# Patient Record
Sex: FEMALE | Race: WHITE | Employment: UNEMPLOYED | URBAN - METROPOLITAN AREA
[De-identification: names, ages, dates, MRNs, and addresses within clinical notes are randomized per-mention and may not be internally consistent; named-entity substitution may affect disease eponyms.]

---

## 2022-08-11 LAB
C. TRACHOMATIS, EXTERNAL RESULT: NEGATIVE
N. GONORRHOEAE, EXTERNAL RESULT: NEGATIVE

## 2022-12-29 LAB
ABO, EXTERNAL RESULT: NORMAL
HEP B, EXTERNAL RESULT: NEGATIVE
HIV, EXTERNAL RESULT: NEGATIVE
RH FACTOR, EXTERNAL RESULT: POSITIVE
RPR, EXTERNAL RESULT: NON REACTIVE
RUBELLA TITER, EXTERNAL RESULT: NORMAL

## 2023-03-10 LAB — GBS, EXTERNAL RESULT: NEGATIVE

## 2023-03-19 ENCOUNTER — HOSPITAL ENCOUNTER (INPATIENT)
Age: 23
LOS: 2 days | Discharge: HOME | End: 2023-03-21
Attending: OBSTETRICS & GYNECOLOGY | Admitting: OBSTETRICS & GYNECOLOGY
Payer: COMMERCIAL

## 2023-03-19 ENCOUNTER — ANESTHESIA EVENT (OUTPATIENT)
Dept: LABOR AND DELIVERY | Age: 23
End: 2023-03-19
Payer: COMMERCIAL

## 2023-03-19 ENCOUNTER — ANESTHESIA (OUTPATIENT)
Dept: LABOR AND DELIVERY | Age: 23
End: 2023-03-19
Payer: COMMERCIAL

## 2023-03-19 PROBLEM — Z37.9 NORMAL LABOR: Status: ACTIVE | Noted: 2023-03-19

## 2023-03-19 LAB
ABO + RH BLD: NORMAL
AMPHETAMINES UR QL SCN>1000 NG/ML: NORMAL
BARBITURATES UR QL SCN>200 NG/ML: NORMAL
BASOPHILS # BLD: 0.1 K/UL (ref 0–0.2)
BASOPHILS NFR BLD: 0.5 %
BENZODIAZ UR QL SCN>200 NG/ML: NORMAL
BLD GP AB SCN SERPL QL: NORMAL
BUPRENORPHINE+NOR UR QL SCN: NORMAL
CANNABINOIDS UR QL SCN>50 NG/ML: NORMAL
COCAINE UR QL SCN: NORMAL
DEPRECATED RDW RBC AUTO: 14 % (ref 12.4–15.4)
DRUG SCREEN COMMENT UR-IMP: NORMAL
EOSINOPHIL # BLD: 0 K/UL (ref 0–0.6)
EOSINOPHIL NFR BLD: 0.2 %
FENTANYL SCREEN, URINE: NORMAL
HCT VFR BLD AUTO: 40.5 % (ref 36–48)
HGB BLD-MCNC: 13.6 G/DL (ref 12–16)
LYMPHOCYTES # BLD: 1.3 K/UL (ref 1–5.1)
LYMPHOCYTES NFR BLD: 10.3 %
MCH RBC QN AUTO: 31.4 PG (ref 26–34)
MCHC RBC AUTO-ENTMCNC: 33.6 G/DL (ref 31–36)
MCV RBC AUTO: 93.5 FL (ref 80–100)
METHADONE UR QL SCN>300 NG/ML: NORMAL
MONOCYTES # BLD: 0.9 K/UL (ref 0–1.3)
MONOCYTES NFR BLD: 6.9 %
NEUTROPHILS # BLD: 10.4 K/UL (ref 1.7–7.7)
NEUTROPHILS NFR BLD: 82.1 %
OPIATES UR QL SCN>300 NG/ML: NORMAL
OXYCODONE UR QL SCN: NORMAL
PCP UR QL SCN>25 NG/ML: NORMAL
PH UR STRIP: 7 [PH]
PLATELET # BLD AUTO: 207 K/UL (ref 135–450)
PMV BLD AUTO: 9.2 FL (ref 5–10.5)
RBC # BLD AUTO: 4.33 M/UL (ref 4–5.2)
WBC # BLD AUTO: 12.6 K/UL (ref 4–11)

## 2023-03-19 PROCEDURE — 2500000003 HC RX 250 WO HCPCS

## 2023-03-19 PROCEDURE — 86780 TREPONEMA PALLIDUM: CPT

## 2023-03-19 PROCEDURE — 85025 COMPLETE CBC W/AUTO DIFF WBC: CPT

## 2023-03-19 PROCEDURE — 2580000003 HC RX 258: Performed by: OBSTETRICS & GYNECOLOGY

## 2023-03-19 PROCEDURE — 6360000002 HC RX W HCPCS

## 2023-03-19 PROCEDURE — 3700000025 EPIDURAL BLOCK: Performed by: ANESTHESIOLOGY

## 2023-03-19 PROCEDURE — 86900 BLOOD TYPING SEROLOGIC ABO: CPT

## 2023-03-19 PROCEDURE — 51701 INSERT BLADDER CATHETER: CPT

## 2023-03-19 PROCEDURE — 86901 BLOOD TYPING SEROLOGIC RH(D): CPT

## 2023-03-19 PROCEDURE — 80307 DRUG TEST PRSMV CHEM ANLYZR: CPT

## 2023-03-19 PROCEDURE — 86850 RBC ANTIBODY SCREEN: CPT

## 2023-03-19 PROCEDURE — 1220000000 HC SEMI PRIVATE OB R&B

## 2023-03-19 RX ORDER — FENTANYL CITRATE 50 UG/ML
INJECTION, SOLUTION INTRAMUSCULAR; INTRAVENOUS
Status: COMPLETED
Start: 2023-03-19 | End: 2023-03-19

## 2023-03-19 RX ORDER — FENTANYL CITRATE 50 UG/ML
INJECTION, SOLUTION INTRAMUSCULAR; INTRAVENOUS
Status: COMPLETED
Start: 2023-03-19 | End: 2023-03-20

## 2023-03-19 RX ORDER — ACETAMINOPHEN 325 MG/1
650 TABLET ORAL EVERY 4 HOURS PRN
Status: DISCONTINUED | OUTPATIENT
Start: 2023-03-19 | End: 2023-03-20

## 2023-03-19 RX ORDER — BUPIVACAINE HYDROCHLORIDE 2.5 MG/ML
INJECTION, SOLUTION EPIDURAL; INFILTRATION; INTRACAUDAL PRN
Status: DISCONTINUED | OUTPATIENT
Start: 2023-03-19 | End: 2023-03-20 | Stop reason: SDUPTHER

## 2023-03-19 RX ORDER — SODIUM CHLORIDE 0.9 % (FLUSH) 0.9 %
5-40 SYRINGE (ML) INJECTION PRN
Status: DISCONTINUED | OUTPATIENT
Start: 2023-03-19 | End: 2023-03-20

## 2023-03-19 RX ORDER — SODIUM CHLORIDE 0.9 % (FLUSH) 0.9 %
5-40 SYRINGE (ML) INJECTION EVERY 12 HOURS SCHEDULED
Status: DISCONTINUED | OUTPATIENT
Start: 2023-03-19 | End: 2023-03-20

## 2023-03-19 RX ORDER — CARBOPROST TROMETHAMINE 250 UG/ML
250 INJECTION, SOLUTION INTRAMUSCULAR PRN
Status: DISCONTINUED | OUTPATIENT
Start: 2023-03-19 | End: 2023-03-20

## 2023-03-19 RX ORDER — DIPHENHYDRAMINE HCL 25 MG
25 TABLET ORAL EVERY 4 HOURS PRN
Status: DISCONTINUED | OUTPATIENT
Start: 2023-03-19 | End: 2023-03-20

## 2023-03-19 RX ORDER — LIDOCAINE HYDROCHLORIDE 20 MG/ML
INJECTION, SOLUTION INFILTRATION; PERINEURAL PRN
Status: DISCONTINUED | OUTPATIENT
Start: 2023-03-19 | End: 2023-03-20 | Stop reason: SDUPTHER

## 2023-03-19 RX ORDER — MISOPROSTOL 100 UG/1
800 TABLET ORAL PRN
Status: DISCONTINUED | OUTPATIENT
Start: 2023-03-19 | End: 2023-03-20

## 2023-03-19 RX ORDER — CETIRIZINE HYDROCHLORIDE 10 MG/1
10 TABLET ORAL DAILY
Status: ON HOLD | COMMUNITY
End: 2023-03-21 | Stop reason: HOSPADM

## 2023-03-19 RX ORDER — FENTANYL CITRATE 50 UG/ML
INJECTION, SOLUTION INTRAMUSCULAR; INTRAVENOUS PRN
Status: DISCONTINUED | OUTPATIENT
Start: 2023-03-19 | End: 2023-03-20 | Stop reason: SDUPTHER

## 2023-03-19 RX ORDER — LIDOCAINE HYDROCHLORIDE AND EPINEPHRINE BITARTRATE 20; .01 MG/ML; MG/ML
INJECTION, SOLUTION SUBCUTANEOUS PRN
Status: DISCONTINUED | OUTPATIENT
Start: 2023-03-19 | End: 2023-03-20 | Stop reason: SDUPTHER

## 2023-03-19 RX ORDER — SODIUM CHLORIDE, SODIUM LACTATE, POTASSIUM CHLORIDE, CALCIUM CHLORIDE 600; 310; 30; 20 MG/100ML; MG/100ML; MG/100ML; MG/100ML
INJECTION, SOLUTION INTRAVENOUS CONTINUOUS
Status: DISCONTINUED | OUTPATIENT
Start: 2023-03-19 | End: 2023-03-20

## 2023-03-19 RX ORDER — SODIUM CHLORIDE 9 MG/ML
25 INJECTION, SOLUTION INTRAVENOUS PRN
Status: DISCONTINUED | OUTPATIENT
Start: 2023-03-19 | End: 2023-03-20

## 2023-03-19 RX ORDER — METHYLERGONOVINE MALEATE 0.2 MG/ML
200 INJECTION INTRAVENOUS PRN
Status: DISCONTINUED | OUTPATIENT
Start: 2023-03-19 | End: 2023-03-20

## 2023-03-19 RX ORDER — SODIUM CHLORIDE, SODIUM LACTATE, POTASSIUM CHLORIDE, AND CALCIUM CHLORIDE .6; .31; .03; .02 G/100ML; G/100ML; G/100ML; G/100ML
500 INJECTION, SOLUTION INTRAVENOUS PRN
Status: DISCONTINUED | OUTPATIENT
Start: 2023-03-19 | End: 2023-03-20

## 2023-03-19 RX ORDER — SODIUM CHLORIDE, SODIUM LACTATE, POTASSIUM CHLORIDE, AND CALCIUM CHLORIDE .6; .31; .03; .02 G/100ML; G/100ML; G/100ML; G/100ML
1000 INJECTION, SOLUTION INTRAVENOUS PRN
Status: DISCONTINUED | OUTPATIENT
Start: 2023-03-19 | End: 2023-03-20

## 2023-03-19 RX ADMIN — LIDOCAINE HYDROCHLORIDE 5 ML: 20 INJECTION, SOLUTION INFILTRATION; PERINEURAL at 16:04

## 2023-03-19 RX ADMIN — LIDOCAINE HYDROCHLORIDE AND EPINEPHRINE 5 ML: 20; 10 INJECTION, SOLUTION INFILTRATION; PERINEURAL at 23:51

## 2023-03-19 RX ADMIN — FENTANYL CITRATE 100 MCG: 50 INJECTION, SOLUTION INTRAMUSCULAR; INTRAVENOUS at 22:48

## 2023-03-19 RX ADMIN — SODIUM CHLORIDE, POTASSIUM CHLORIDE, SODIUM LACTATE AND CALCIUM CHLORIDE: 600; 310; 30; 20 INJECTION, SOLUTION INTRAVENOUS at 22:54

## 2023-03-19 RX ADMIN — BUPIVACAINE HYDROCHLORIDE 5 MG: 2.5 INJECTION, SOLUTION EPIDURAL; INFILTRATION; INTRACAUDAL; PERINEURAL at 15:31

## 2023-03-19 RX ADMIN — LIDOCAINE HYDROCHLORIDE 5 ML: 20 INJECTION, SOLUTION INFILTRATION; PERINEURAL at 18:51

## 2023-03-19 RX ADMIN — LIDOCAINE HYDROCHLORIDE AND EPINEPHRINE 5 ML: 20; 10 INJECTION, SOLUTION INFILTRATION; PERINEURAL at 22:50

## 2023-03-19 RX ADMIN — BUPIVACAINE HYDROCHLORIDE 5 MG: 2.5 INJECTION, SOLUTION EPIDURAL; INFILTRATION; INTRACAUDAL; PERINEURAL at 19:52

## 2023-03-19 RX ADMIN — FENTANYL CITRATE 100 MCG: 50 INJECTION, SOLUTION INTRAMUSCULAR; INTRAVENOUS at 16:04

## 2023-03-19 RX ADMIN — Medication 15 ML/HR: at 15:31

## 2023-03-19 RX ADMIN — LIDOCAINE HYDROCHLORIDE AND EPINEPHRINE 3 ML: 20; 10 INJECTION, SOLUTION INFILTRATION; PERINEURAL at 15:29

## 2023-03-19 RX ADMIN — BUPIVACAINE HYDROCHLORIDE 5 MG: 2.5 INJECTION, SOLUTION EPIDURAL; INFILTRATION; INTRACAUDAL; PERINEURAL at 19:50

## 2023-03-19 RX ADMIN — FENTANYL CITRATE 100 MCG: 50 INJECTION, SOLUTION INTRAMUSCULAR; INTRAVENOUS at 23:51

## 2023-03-19 RX ADMIN — BUPIVACAINE HYDROCHLORIDE 5 MG: 2.5 INJECTION, SOLUTION EPIDURAL; INFILTRATION; INTRACAUDAL; PERINEURAL at 15:37

## 2023-03-19 RX ADMIN — SODIUM CHLORIDE, POTASSIUM CHLORIDE, SODIUM LACTATE AND CALCIUM CHLORIDE: 600; 310; 30; 20 INJECTION, SOLUTION INTRAVENOUS at 15:00

## 2023-03-19 RX ADMIN — FENTANYL CITRATE 100 MCG: 50 INJECTION, SOLUTION INTRAMUSCULAR; INTRAVENOUS at 18:51

## 2023-03-19 RX ADMIN — SODIUM CHLORIDE, POTASSIUM CHLORIDE, SODIUM LACTATE AND CALCIUM CHLORIDE: 600; 310; 30; 20 INJECTION, SOLUTION INTRAVENOUS at 15:59

## 2023-03-19 RX ADMIN — SODIUM CHLORIDE, POTASSIUM CHLORIDE, SODIUM LACTATE AND CALCIUM CHLORIDE: 600; 310; 30; 20 INJECTION, SOLUTION INTRAVENOUS at 15:48

## 2023-03-19 NOTE — H&P
Department of Obstetrics and Gynecology   Obstetrics History and Physical        CHIEF COMPLAINT:  contractions    HISTORY OF PRESENT ILLNESS:      The patient is a 20 y.o. female at 38w0d. OB History          1    Para        Term                AB        Living             SAB        IAB        Ectopic        Molar        Multiple        Live Births                Patient presents with a chief complaint as above and is being admitted for active phase labor    Estimated Due Date: Estimated Date of Delivery: 23    PRENATAL CARE:    Complicated by: none    PAST OB HISTORY:  OB History          1    Para        Term                AB        Living             SAB        IAB        Ectopic        Molar        Multiple        Live Births                    Past Medical History:        Diagnosis Date    Mental disorder      Past Surgical History:        Procedure Laterality Date    WISDOM TOOTH EXTRACTION Bilateral      Allergies:  Patient has no known allergies.     Social History:    Social History     Socioeconomic History    Marital status:      Spouse name: Not on file    Number of children: Not on file    Years of education: Not on file    Highest education level: Not on file   Occupational History    Not on file   Tobacco Use    Smoking status: Never    Smokeless tobacco: Never   Vaping Use    Vaping Use: Never used   Substance and Sexual Activity    Alcohol use: No    Drug use: No    Sexual activity: Never   Other Topics Concern    Not on file   Social History Narrative    Not on file     Social Determinants of Health     Financial Resource Strain: Not on file   Food Insecurity: Not on file   Transportation Needs: Not on file   Physical Activity: Not on file   Stress: Not on file   Social Connections: Not on file   Intimate Partner Violence: Not on file   Housing Stability: Not on file     Family History:       Problem Relation Age of Onset    Diabetes Father     Cancer

## 2023-03-19 NOTE — ANESTHESIA PROCEDURE NOTES
Epidural Block    Patient location during procedure: OB  Start time: 3/19/2023 3:20 PM  End time: 3/19/2023 3:52 PM  Reason for block: labor epidural  Staffing  Performed: resident/CRNA   Resident/CRNA: VEL Long CRNA  Epidural  Patient position: sitting  Prep: Betadine  Patient monitoring: cardiac monitor, continuous pulse ox and frequent blood pressure checks  Approach: midline  Location: L4-5  Injection technique: LARRY air  Provider prep: mask and sterile gloves  Needle  Needle type: Tuohy   Needle gauge: 18 G  Needle length: 3.5 in  Needle insertion depth: 6 cm  Catheter type: side hole  Catheter size: 20 G.   Catheter at skin depth: 14 cm  Test dose: negativeCatheter Secured: tegaderm and tape  Assessment  Sensory level: T10  Hemodynamics: stable  Attempts: 1  Outcomes: uncomplicated and patient tolerated procedure well  Preanesthetic Checklist  Completed: patient identified, IV checked, site marked, risks and benefits discussed, surgical/procedural consents, equipment checked, pre-op evaluation, timeout performed, anesthesia consent given, oxygen available and monitors applied/VS acknowledged

## 2023-03-19 NOTE — PROGRESS NOTES
Moderate to firm contractions every 2 to 4 minutes. Active and reactive fetus. Report of same and of pt dil 2-3/80/-1 called to Dr Lilliana Miramontes. To admit pt for labor.

## 2023-03-19 NOTE — ANESTHESIA PRE PROCEDURE
Department of Anesthesiology  Preprocedure Note       Name:  Aung Locke   Age:  20 y.o.  :  2000                                          MRN:  [id number]         Date:  3/19/2023      Surgeon: * No surgeons listed *    Procedure: * No procedures listed *    Medications prior to admission:   Prior to Admission medications    Medication Sig Start Date End Date Taking?  Authorizing Provider   Prenatal MV-Min-Fe Fum-FA-DHA (PRENATAL 1 PO) Take by mouth   Yes Historical Provider, MD   Probiotic Product (PROBIOTIC-10 PO) Take by mouth   Yes Historical Provider, MD   cetirizine (ZYRTEC) 10 MG tablet Take 10 mg by mouth daily   Yes Historical Provider, MD       Current medications:    Current Facility-Administered Medications   Medication Dose Route Frequency Provider Last Rate Last Admin    lactated ringers IV soln infusion   IntraVENous Continuous Jerson Escalona MD        lactated ringers bolus  500 mL IntraVENous PRN Jerson Escalona MD        Or    lactated ringers bolus  1,000 mL IntraVENous PRN Jerson Escalona MD        sodium chloride flush 0.9 % injection 5-40 mL  5-40 mL IntraVENous 2 times per day Jerson Escalona MD        sodium chloride flush 0.9 % injection 5-40 mL  5-40 mL IntraVENous PRN Jerson Escalona MD        0.9 % sodium chloride infusion  25 mL IntraVENous PRN Jerson Escalona MD        diphenhydrAMINE (BENADRYL) tablet 25 mg  25 mg Oral Q4H PRN Jerson Escalona MD        methylergonovine (METHERGINE) injection 200 mcg  200 mcg IntraMUSCular PRN Jerson Escalona MD        carboprost (HEMABATE) injection 250 mcg  250 mcg IntraMUSCular PRN Jerson Escalona MD        miSOPROStol (CYTOTEC) tablet 800 mcg  800 mcg Rectal PRN Jerson Escalona MD        tranexamic acid (CYKLOKAPRON) 1,000 mg in sodium chloride 0.9 % 100 mL IVPB  1,000 mg IntraVENous Once PRN Jerson Escalona MD        acetaminophen (TYLENOL) tablet 650 mg  650 mg Oral

## 2023-03-19 NOTE — PROGRESS NOTES
Pt presents to triage 2 c/o contractions for the last two hours. States thinks her water broke on the way up to [facility].  [facility] [place] did not call her dr prior to coming to hospital

## 2023-03-20 LAB — REAGIN+T PALLIDUM IGG+IGM SERPL-IMP: NORMAL

## 2023-03-20 PROCEDURE — 51701 INSERT BLADDER CATHETER: CPT

## 2023-03-20 PROCEDURE — 6360000002 HC RX W HCPCS

## 2023-03-20 PROCEDURE — 2500000003 HC RX 250 WO HCPCS

## 2023-03-20 PROCEDURE — 1220000000 HC SEMI PRIVATE OB R&B

## 2023-03-20 PROCEDURE — 6370000000 HC RX 637 (ALT 250 FOR IP): Performed by: OBSTETRICS & GYNECOLOGY

## 2023-03-20 PROCEDURE — 7200000001 HC VAGINAL DELIVERY

## 2023-03-20 PROCEDURE — 0KQM0ZZ REPAIR PERINEUM MUSCLE, OPEN APPROACH: ICD-10-PCS | Performed by: OBSTETRICS & GYNECOLOGY

## 2023-03-20 PROCEDURE — 6360000002 HC RX W HCPCS: Performed by: OBSTETRICS & GYNECOLOGY

## 2023-03-20 RX ORDER — ACETAMINOPHEN 500 MG
1000 TABLET ORAL EVERY 8 HOURS SCHEDULED
Status: DISCONTINUED | OUTPATIENT
Start: 2023-03-20 | End: 2023-03-21 | Stop reason: HOSPADM

## 2023-03-20 RX ORDER — SODIUM CHLORIDE 9 MG/ML
INJECTION, SOLUTION INTRAVENOUS PRN
Status: DISCONTINUED | OUTPATIENT
Start: 2023-03-20 | End: 2023-03-21 | Stop reason: HOSPADM

## 2023-03-20 RX ORDER — METHYLERGONOVINE MALEATE 0.2 MG/ML
200 INJECTION INTRAVENOUS PRN
Status: DISCONTINUED | OUTPATIENT
Start: 2023-03-20 | End: 2023-03-21 | Stop reason: HOSPADM

## 2023-03-20 RX ORDER — IBUPROFEN 600 MG/1
600 TABLET ORAL EVERY 8 HOURS SCHEDULED
Status: DISCONTINUED | OUTPATIENT
Start: 2023-03-20 | End: 2023-03-21 | Stop reason: HOSPADM

## 2023-03-20 RX ORDER — DIPHENHYDRAMINE HYDROCHLORIDE 50 MG/ML
12.5 INJECTION INTRAMUSCULAR; INTRAVENOUS EVERY 6 HOURS PRN
Status: DISCONTINUED | OUTPATIENT
Start: 2023-03-20 | End: 2023-03-21 | Stop reason: HOSPADM

## 2023-03-20 RX ORDER — SODIUM CHLORIDE, SODIUM LACTATE, POTASSIUM CHLORIDE, CALCIUM CHLORIDE 600; 310; 30; 20 MG/100ML; MG/100ML; MG/100ML; MG/100ML
INJECTION, SOLUTION INTRAVENOUS CONTINUOUS
Status: DISCONTINUED | OUTPATIENT
Start: 2023-03-20 | End: 2023-03-21 | Stop reason: HOSPADM

## 2023-03-20 RX ORDER — SODIUM CHLORIDE 0.9 % (FLUSH) 0.9 %
5-40 SYRINGE (ML) INJECTION EVERY 12 HOURS SCHEDULED
Status: DISCONTINUED | OUTPATIENT
Start: 2023-03-20 | End: 2023-03-21 | Stop reason: HOSPADM

## 2023-03-20 RX ORDER — KETOROLAC TROMETHAMINE 30 MG/ML
30 INJECTION, SOLUTION INTRAMUSCULAR; INTRAVENOUS EVERY 6 HOURS
Status: COMPLETED | OUTPATIENT
Start: 2023-03-20 | End: 2023-03-20

## 2023-03-20 RX ORDER — SODIUM CHLORIDE 0.9 % (FLUSH) 0.9 %
5-40 SYRINGE (ML) INJECTION PRN
Status: DISCONTINUED | OUTPATIENT
Start: 2023-03-20 | End: 2023-03-21 | Stop reason: HOSPADM

## 2023-03-20 RX ORDER — DOCUSATE SODIUM 100 MG/1
100 CAPSULE, LIQUID FILLED ORAL 2 TIMES DAILY
Status: DISCONTINUED | OUTPATIENT
Start: 2023-03-20 | End: 2023-03-21 | Stop reason: HOSPADM

## 2023-03-20 RX ORDER — FERROUS SULFATE 325(65) MG
325 TABLET ORAL 2 TIMES DAILY WITH MEALS
Status: DISCONTINUED | OUTPATIENT
Start: 2023-03-20 | End: 2023-03-21 | Stop reason: HOSPADM

## 2023-03-20 RX ORDER — BUPIVACAINE HYDROCHLORIDE 5 MG/ML
INJECTION, SOLUTION EPIDURAL; INTRACAUDAL PRN
Status: DISCONTINUED | OUTPATIENT
Start: 2023-03-20 | End: 2023-03-20 | Stop reason: SDUPTHER

## 2023-03-20 RX ORDER — ONDANSETRON 2 MG/ML
4 INJECTION INTRAMUSCULAR; INTRAVENOUS EVERY 6 HOURS PRN
Status: DISCONTINUED | OUTPATIENT
Start: 2023-03-20 | End: 2023-03-21 | Stop reason: HOSPADM

## 2023-03-20 RX ORDER — ONDANSETRON 2 MG/ML
INJECTION INTRAMUSCULAR; INTRAVENOUS
Status: DISCONTINUED
Start: 2023-03-20 | End: 2023-03-20 | Stop reason: WASHOUT

## 2023-03-20 RX ORDER — LIDOCAINE HYDROCHLORIDE 10 MG/ML
INJECTION, SOLUTION INFILTRATION; PERINEURAL
Status: DISCONTINUED
Start: 2023-03-20 | End: 2023-03-20

## 2023-03-20 RX ORDER — LANOLIN 100 %
OINTMENT (GRAM) TOPICAL PRN
Status: DISCONTINUED | OUTPATIENT
Start: 2023-03-20 | End: 2023-03-21 | Stop reason: HOSPADM

## 2023-03-20 RX ADMIN — BUPIVACAINE HYDROCHLORIDE 5 ML: 5 INJECTION, SOLUTION EPIDURAL; INTRACAUDAL; PERINEURAL at 03:33

## 2023-03-20 RX ADMIN — DOCUSATE SODIUM 100 MG: 100 CAPSULE, LIQUID FILLED ORAL at 09:43

## 2023-03-20 RX ADMIN — Medication 166.7 ML: at 04:02

## 2023-03-20 RX ADMIN — KETOROLAC TROMETHAMINE 30 MG: 30 INJECTION, SOLUTION INTRAMUSCULAR; INTRAVENOUS at 16:26

## 2023-03-20 RX ADMIN — ACETAMINOPHEN 1000 MG: 500 TABLET ORAL at 18:08

## 2023-03-20 RX ADMIN — WITCH HAZEL: 500 SOLUTION RECTAL; TOPICAL at 20:53

## 2023-03-20 RX ADMIN — ACETAMINOPHEN 1000 MG: 500 TABLET ORAL at 09:44

## 2023-03-20 RX ADMIN — KETOROLAC TROMETHAMINE 30 MG: 30 INJECTION, SOLUTION INTRAMUSCULAR; INTRAVENOUS at 06:44

## 2023-03-20 RX ADMIN — FERROUS SULFATE TAB 325 MG (65 MG ELEMENTAL FE) 325 MG: 325 (65 FE) TAB at 09:44

## 2023-03-20 RX ADMIN — Medication 87.3 MILLI-UNITS/MIN: at 04:02

## 2023-03-20 RX ADMIN — BUPIVACAINE HYDROCHLORIDE 5 ML: 5 INJECTION, SOLUTION EPIDURAL; INTRACAUDAL; PERINEURAL at 03:23

## 2023-03-20 ASSESSMENT — PAIN SCALES - GENERAL
PAINLEVEL_OUTOF10: 0
PAINLEVEL_OUTOF10: 0
PAINLEVEL_OUTOF10: 2

## 2023-03-20 ASSESSMENT — PAIN DESCRIPTION - LOCATION: LOCATION: ABDOMEN

## 2023-03-20 ASSESSMENT — PAIN DESCRIPTION - ORIENTATION: ORIENTATION: LOWER

## 2023-03-20 ASSESSMENT — PAIN DESCRIPTION - DESCRIPTORS: DESCRIPTORS: CRAMPING

## 2023-03-20 NOTE — LACTATION NOTE
This note was copied from a baby's chart. Lactation Progress Note      Data:   Initial lactation consult with primrich that delivered early this morning. Infant is currently 7 hours old. MOB reports infant has been to both breast and seems to be feeding well so far. Reports infant just finished a good 15 minutes feeding about 30 minutes ago. Denies questions or concerns at this time. Infant stool established. Action: Introduced self to patient as Lactation RN, name and phone number written on white board in room. Breastfeeding Booklet brought to room with contents reviewed, and encouraged family to review more thoroughly with watching videos that are attached. Reviewed with mother what to expect over the next  24 hours with infant feedings, infant output, and breast care. Instruction given to family to call with next feeding attempt so that [facility] can observed infant position and latch at breast and offer further support as needed. All questions answered at this time. Response: MOB verbalizes understanding. States that she will call LC back to room with next feeding attempt.

## 2023-03-20 NOTE — PROGRESS NOTES
RN remained at bedside throughout pushing. EFM continuously assessed. Vaginal delivery of viable male infant.

## 2023-03-20 NOTE — PROGRESS NOTES
Dr. Yates Dandy at bedside to evaluate. Discussing risks and benefits of forceps with pt. Pt still very uncomfortable with epidural. Anesthesia notified and en route to bedside.

## 2023-03-20 NOTE — PROGRESS NOTES
Department of Obstetrics and Gynecology  Labor and Delivery  Attending Post Partum Progress Note      SUBJECTIVE:  No probs. Ambulating and voiding without difficulty. Lochia normal.    OBJECTIVE:      Vitals:  /71   Pulse 100   Temp 98.1 °F (36.7 °C) (Axillary)   Resp 18   Ht 5' 4\" (1.626 m)   Wt 175 lb (79.4 kg)   SpO2 99%   Breastfeeding Unknown   BMI 30.04 kg/m²     ABDOMEN:  deferred  GENITAL/URINARY:  deferred    DATA:    No new labs    ASSESSMENT & PLAN:    IMP:  PPD#0 S/P FAVD, doing well. PLAN:  Continue care. Possible discharge 3/21/23.     Alexandra Wiseman MD

## 2023-03-20 NOTE — PROGRESS NOTES
Dr. Jerad Hdz at bedside at [address] [facility]. SVE 10/100/+1. Pushing started at 0108. Patient actively pushing. RN remains in continuous attendance at the bedside. Assessment & evaluation of fetal heart rate ongoing via continuous EFM.

## 2023-03-20 NOTE — PROGRESS NOTES
Dr. Susanne Mcguire called to room for AROM. After rolling pt onto her back, fluid was noted from vagina. Water appeared to already be broke. Dr. Susanne Mcguire left room.

## 2023-03-20 NOTE — PROGRESS NOTES
Charge nurse VIJAY Santamaria called to room to look at bleeding. Pt in extreme pain, unable to relax between contractions. Abdomen soft between contractions. Dr. Ulisses Moses called to room to evaluate. AROM  at 0054 by Dr. Ulisses Moses. Large amount of clear fluid. SVE 9/100/0 at 0058 per Dr. Ulisses Moses. Dr. Rao Brantley called and notified.  Dr. Rao Brantley en route to hospital.

## 2023-03-20 NOTE — PLAN OF CARE
Problem: Pain  Goal: Verbalizes/displays adequate comfort level or baseline comfort level  Outcome: Progressing     Problem: Postpartum  Goal: Experiences normal postpartum course  Outcome: Progressing  Goal: Appropriate maternal -  bonding  Outcome: Progressing  Goal: Establishment of infant feeding pattern  Outcome: Progressing  Goal: Incisions, wounds, or drain sites healing without S/S of infection  Outcome: Progressing     Problem: Infection - Adult  Goal: Absence of infection during hospitalization  Outcome: Progressing     Problem: Safety - Adult  Goal: Free from fall injury  Outcome: Progressing     Problem: Discharge Planning  Goal: Discharge to home or other facility with appropriate resources  Outcome: Progressing

## 2023-03-20 NOTE — PROGRESS NOTES
Department of Obstetrics and Gynecology  Labor and Delivery   Housedoc Note      SUBJECTIVE:  CTSP to perform amniotomy. OBJECTIVE:      Vitals:    BP (!) 143/83   Pulse 82   Temp 99 °F (37.2 °C) (Oral)   Resp 16   Ht 5' 4\" (1.626 m)   Wt 175 lb (79.4 kg)   SpO2 99%   BMI 30.04 kg/m²       Fetal heart rate:       Baseline Heart Rate:  145        Accelerations:  present       Long Term Variability:  moderate       Decelerations:  absent         Contraction frequency: 2 minutes    Fetal Presentation:  Cephalic,     Membranes:  Ruptured clear fluid  Upon arrival to room, fluid noted from vagina--consistent with AROM. Further evaluation revealed bulging membranes--unknown dilatation as cervix was not palpable. Amniotomy was performed for large amount of clear fluid. Recheck revealed 9 cm dilation. Watery blood noted prior to rupture. No apparent blood or clot noted with amniotomy. Cervix:           Dilation:  9 cm         Effacement:  90         Station:  +1         Consistency:  soft    Discussed with Dr. Jacque Nava. Funmilayo Cruz D.O.    [facility]

## 2023-03-20 NOTE — PROGRESS NOTES
Pt assisted by 2 staff members to restroom for first time get up. Pt denies dizziness or lightheadedness. Pt began uncontrollably urinating upon standing. Chux pad became saturated with urine. Unable to determine recovery QBL. Gait steady. Pt voided 200 mL urine in urine hat without difficulty. Pericare done by pt with RN instruction. New ice pack, pad and panties put on pt. Gown changed. Hand hygiene done. Complete linen change done and comfort pad put on bed. Pt tolerated well.

## 2023-03-20 NOTE — L&D DELIVERY SUMMARY NOTE
[address] [address]                            LABOR AND DELIVERY NOTE    PATIENT NAME: Tamiko Ragland                   :        2000  MED REC NO:   [id number]                          ROOM:       5237  ACCOUNT NO:   [de-identified]                           ADMIT DATE: 2023  PROVIDER:     Jose J Feldman MD    DATE OF PROCEDURE:  2023    DELIVERY NOTE     27-year-old  1, para 0, who presented in labor  at 37 weeks. The patient's prenatal course was uncomplicated. Group B  beta strep culture was negative. Fetal heart tracing was reassuring  upon admission. The patient did request and receive an epidural. Epidural, however,  failed to give the patient good comfort measures, therefore it was  replaced. A second epidural did help, but needed several re-doses. The patient progressed to second stage. During the second stage,  moderate to severe variables were noted, unresolved with position  change. Variability remained moderate, but fetal tachycardia was also noted. Discussion was carried out with the patient in regards to shortening  the second stage. Baby was in the occiput anterior position with the  scalp visible between contractions. Delivery route of  delivery  versus outlet forceps were reviewed, side effects, benefits and risks  and the patient and her  consented to a forceps delivery. Epidural was quickly dosed. The patient was prepped and draped in a  sterile fashion. The bladder was emptied. Luikart-Marinelli forceps were  used with excellent application x 3. Through one contraction delivery was  carried out. Forceps were removed prior to expulsion of the fetal head. No episiotomy was performed, but a second-degree perineal laceration  occurred with delivery. Baby was handed off to special care nurses who  were present for delivery.   Baby was vigorous

## 2023-03-20 NOTE — PROGRESS NOTES
Called to notify Dr. Pedro Luis An of E 7-8/80/-1 with large amount of bloody show. Pt unable to get comfortable. Epidural has been replaced twice and pt has had multiple redoses. Abdomen soft between contractions. Order to put in marrero catheter and have house officer break water.

## 2023-03-20 NOTE — BRIEF OP NOTE
Brief Postoperative Note      Patient: Sparkle Kim  YOB: 2000  MRN: [id number]    Date of Procedure:      Outlet forceps secondary to maternal exhaustion and NRFWB  VMI, Apgars 9/9  No epis, 2nd degree perineal lac  Placenta spon   cc  \"Lakhwinder\"    Electronically signed by Irais Eduardo MD on 3/20/2023 at 4:16 AM

## 2023-03-20 NOTE — PROGRESS NOTES
Late entry from 0117 hrs    AROM per HO-clear AF  Cx C/+2/KHUSHBOO  FHT-Cat 1  Ctxs q2 min  Epidural x 2 with minimal pain relief    2nd stage.     Sridevi Ruiz

## 2023-03-21 VITALS
HEART RATE: 85 BPM | BODY MASS INDEX: 29.88 KG/M2 | TEMPERATURE: 97.7 F | HEIGHT: 64 IN | WEIGHT: 175 LBS | OXYGEN SATURATION: 99 % | DIASTOLIC BLOOD PRESSURE: 89 MMHG | SYSTOLIC BLOOD PRESSURE: 124 MMHG | RESPIRATION RATE: 16 BRPM

## 2023-03-21 LAB
DEPRECATED RDW RBC AUTO: 14.2 % (ref 12.4–15.4)
HCT VFR BLD AUTO: 31.9 % (ref 36–48)
HGB BLD-MCNC: 10.7 G/DL (ref 12–16)
MCH RBC QN AUTO: 31.7 PG (ref 26–34)
MCHC RBC AUTO-ENTMCNC: 33.5 G/DL (ref 31–36)
MCV RBC AUTO: 94.6 FL (ref 80–100)
PLATELET # BLD AUTO: 182 K/UL (ref 135–450)
PMV BLD AUTO: 8.9 FL (ref 5–10.5)
RBC # BLD AUTO: 3.37 M/UL (ref 4–5.2)
WBC # BLD AUTO: 12.8 K/UL (ref 4–11)

## 2023-03-21 PROCEDURE — 6370000000 HC RX 637 (ALT 250 FOR IP): Performed by: OBSTETRICS & GYNECOLOGY

## 2023-03-21 PROCEDURE — 85027 COMPLETE CBC AUTOMATED: CPT

## 2023-03-21 PROCEDURE — 36415 COLL VENOUS BLD VENIPUNCTURE: CPT

## 2023-03-21 PROCEDURE — 2580000003 HC RX 258: Performed by: OBSTETRICS & GYNECOLOGY

## 2023-03-21 PROCEDURE — 6370000000 HC RX 637 (ALT 250 FOR IP)

## 2023-03-21 RX ORDER — FERROUS SULFATE 325(65) MG
325 TABLET ORAL
Qty: 30 TABLET | Refills: 3 | COMMUNITY
Start: 2023-03-21

## 2023-03-21 RX ORDER — FAMOTIDINE 20 MG/1
TABLET, FILM COATED ORAL
Status: COMPLETED
Start: 2023-03-21 | End: 2023-03-21

## 2023-03-21 RX ORDER — SERTRALINE HYDROCHLORIDE 25 MG/1
25 TABLET, FILM COATED ORAL DAILY
Qty: 30 TABLET | Refills: 3 | Status: SHIPPED | OUTPATIENT
Start: 2023-03-21

## 2023-03-21 RX ORDER — IBUPROFEN 600 MG/1
600 TABLET ORAL EVERY 8 HOURS SCHEDULED
Qty: 120 TABLET | Refills: 3 | COMMUNITY
Start: 2023-03-21

## 2023-03-21 RX ADMIN — FAMOTIDINE 20 MG: 20 TABLET ORAL at 11:52

## 2023-03-21 RX ADMIN — BENZOCAINE AND LEVOMENTHOL: 200; 5 SPRAY TOPICAL at 13:27

## 2023-03-21 RX ADMIN — ACETAMINOPHEN 1000 MG: 500 TABLET ORAL at 03:57

## 2023-03-21 RX ADMIN — DOCUSATE SODIUM 100 MG: 100 CAPSULE, LIQUID FILLED ORAL at 09:05

## 2023-03-21 RX ADMIN — WITCH HAZEL: 500 SOLUTION RECTAL; TOPICAL at 13:27

## 2023-03-21 RX ADMIN — ACETAMINOPHEN 1000 MG: 500 TABLET ORAL at 12:18

## 2023-03-21 RX ADMIN — Medication 10 ML: at 09:07

## 2023-03-21 RX ADMIN — IBUPROFEN 600 MG: 600 TABLET, FILM COATED ORAL at 09:04

## 2023-03-21 RX ADMIN — IBUPROFEN 600 MG: 600 TABLET, FILM COATED ORAL at 00:32

## 2023-03-21 ASSESSMENT — PAIN SCALES - GENERAL
PAINLEVEL_OUTOF10: 3
PAINLEVEL_OUTOF10: 0
PAINLEVEL_OUTOF10: 2

## 2023-03-21 ASSESSMENT — PAIN DESCRIPTION - LOCATION
LOCATION: ABDOMEN
LOCATION: ABDOMEN

## 2023-03-21 ASSESSMENT — PAIN DESCRIPTION - DESCRIPTORS
DESCRIPTORS: CRAMPING
DESCRIPTORS: CRAMPING

## 2023-03-21 ASSESSMENT — PAIN - FUNCTIONAL ASSESSMENT
PAIN_FUNCTIONAL_ASSESSMENT: ACTIVITIES ARE NOT PREVENTED
PAIN_FUNCTIONAL_ASSESSMENT: ACTIVITIES ARE NOT PREVENTED

## 2023-03-21 NOTE — LACTATION NOTE
This note was copied from a baby's chart.  Lactation Progress Note      Data:   Follow up with primip breastfeeder attempting feed at this time. Infant sleepy and skin to skin with mother upon entry to room.     Action: Introduced self, number on whiteboard. Encouraged mother to express drops of colostrum to entice infant to latch. Infant began rooting. EMILY achieved with wide open mouth and breast support by mother. SRS noted with gentle stimulation. Mother reports comfort with latch. Educated on feeding cues and encouraged ad franky feeds with cues. If infant is sleepy or disinterested, encouraged skin to skin and hand expression with offering of colostrum every 2-3 hours. Reviewed ways to know infant is getting enough including weight changes, output and satisfaction cues. Stressed importance of deep comfortable latch and ways to achieve this. Feeding continued for 16 minutes and infant remained latched when exiting room. Denied further questions.    Response: Mother pleased with how feeding has been going. Encouraged to call for follow up as needed.

## 2023-03-21 NOTE — DISCHARGE INSTRUCTIONS
Thank you for the opportunity to care for you and your family. We hope that you are happy with the care we provided during your stay in the [facility]. We want to ensure that you have the help you need when you leave the hospital. If there is anything we can assist you with, please let us know. Breastfeeding mothers may contact our lactation specialists with any problems or questions. [facility] lactation services phone number is [phone]. Leave a message and your call will be returned. Please refer to the information provided in the \"Caring for Yourself\" tab in your discharge binder (Guidelines for [employer]). The following are warning signs to remember. Call 911 if you have:    Chest pain or pressure  Shortness of breath, even at rest  Thoughts of harming yourself or your baby  Seizures    Call your healthcare provider if you have:    Temperature of 100.4 degrees or higher  Stitches that are not healing        -- Swelling, bleeding, drainage, foul odor, redness or warmth in/around your           stitches, staples, or incision (scar)        -- Bad smelling blood or discharge from the vagina  Vaginal bleeding that has increased         -- Soaking through one pad in an hour        -- You are passing clots larger than the size of a lemon  Red, warm tender area(s) in your breast or calf  Headache that does not get better, even after taking medicine; or headache with vision changes    Remember to notify all healthcare providers from your date of delivery to up to one year after giving birth! CARING FOR YOURSELF        DIET/ACTIVITY    Eat a well balanced diet focusing on foods high in fiber and protein. Drink plenty of fluids, especially water. To avoid constipation you may take a mild stool softener as recommended by your doctor or midwife. Gradually increase your activity. Resume an exercise regime only after being advised by your doctor or midwife.   When sitting or lying down,

## 2023-03-21 NOTE — DISCHARGE SUMMARY
Department of Obstetrics and Gynecology  Postpartum Discharge Summary      Admit Date: 3/19/2023    Admit Diagnosis:     Discharge Date: 23    Condition at Discharge: stable    Discharge Diagnoses: forceps assisted vaginal delivery    Discharge Disposition:  Home    Service: Obstetrics    Postpartum complications: none     Hospital Course: uncomplicated    [place] Data:  Delivery Date:   3/20/2023   3:52 AM     Weight   Information for the patient's : Vicki Aldana [[id number]]   Birth Weight: 6 lb 4.7 oz (2.855 kg)   Apgars   Information for the patient's : [place], Baby Boy Amish Knee [[id number]]   APGAR One: 9    Apgars   Information for the patient's : João Wellington Knee [[id number]]   APGAR Five: 9   Disposition of Baby:  Home with mother    Current Discharge Medication List        START taking these medications    Details   ibuprofen (ADVIL;MOTRIN) 600 MG tablet Take 1 tablet by mouth every 8 hours  Qty: 120 tablet, Refills: 3      ferrous sulfate (IRON 325) 325 (65 Fe) MG tablet Take 1 tablet by mouth daily (with breakfast)  Qty: 30 tablet, Refills: 3           CONTINUE these medications which have NOT CHANGED    Details   Prenatal MV-Min-Fe Fum-FA-DHA (PRENATAL 1 PO) Take by mouth           STOP taking these medications       Probiotic Product (PROBIOTIC-10 PO) Comments:   Reason for Stopping:         cetirizine (ZYRTEC) 10 MG tablet Comments:   Reason for Stopping:         BENZOYL PEROXIDE 5 % external wash Comments:   Reason for Stopping: Follow-up: postpartum appt in 6 weeks.             Electronically signed by Jordana Rose MD on 3/21/2023 at 9:55 AM

## 2023-03-21 NOTE — ANESTHESIA POSTPROCEDURE EVALUATION
Department of Anesthesiology  Postprocedure Note    Patient: Jam Merritt  MRN: [id number]  YOB: 2000  Date of evaluation: 3/21/2023      Procedure Summary     Date: 03/19/23 Room / Location:     Anesthesia Start: 1520 Anesthesia Stop: 03/20/23 0352    Procedure: Labor Analgesia Diagnosis:     Scheduled Providers:  Responsible Provider: Shruthi Avelar MD    Anesthesia Type: general, spinal, epidural ASA Status: 2          Anesthesia Type: No value filed.     Jaleel Phase I:      Jaleel Phase II:        Anesthesia Post Evaluation    Patient location during evaluation: bedside  Patient participation: complete - patient participated  Level of consciousness: awake and alert  Pain score: 1  Nausea & Vomiting: no nausea and no vomiting  Complications: no  Cardiovascular status: hemodynamically unstable  Respiratory status: acceptable  Hydration status: euvolemic

## 2023-03-21 NOTE — PLAN OF CARE
Problem: Pain  Goal: Verbalizes/displays adequate comfort level or baseline comfort level  Outcome: Progressing     Problem: Postpartum  Goal: Experiences normal postpartum course  Description:  Postpartum OB-Pregnancy care plan goal which identifies if the mother is experiencing a normal postpartum course  Outcome: Progressing  Goal: Appropriate maternal -  bonding  Description:  Postpartum OB-Pregnancy care plan goal which identifies if the mother and  are bonding appropriately  Outcome: Progressing  Goal: Establishment of infant feeding pattern  Description:  Postpartum OB-Pregnancy care plan goal which identifies if the mother is establishing a feeding pattern with their   Outcome: Progressing  Goal: Incisions, wounds, or drain sites healing without S/S of infection  Outcome: Progressing     Problem: Infection - Adult  Goal: Absence of infection during hospitalization  Outcome: Progressing     Problem: Safety - Adult  Goal: Free from fall injury  Outcome: Progressing     Problem: Discharge Planning  Goal: Discharge to home or other facility with appropriate resources  Outcome: Progressing

## 2023-03-21 NOTE — PROGRESS NOTES
Department of Obstetrics and Gynecology  Labor and Delivery  Attending Post Partum Progress Note      SUBJECTIVE:  Pt without complaints, pain controlled, tolerating po, lochia wnl, currently breast feeding. OBJECTIVE:      Vitals:  Vitals:    23 0900   BP: 124/89   Pulse: 85   Resp: 16   Temp: 97.7 °F (36.5 °C)   SpO2: 99%       RRR CTAB  ABDOMEN:  soft, non-distended, non-tender, + BS  FF below umbilicus  EXT: no edema    DATA:    CBC:    Lab Results   Component Value Date/Time    WBC 12.8 2023 06:57 AM    HGB 10.7 2023 06:57 AM    HCT 31.9 2023 06:57 AM     2023 06:57 AM       ASSESSMENT & PLAN:    20 y.o.   OB History          1    Para   1    Term   1            AB        Living   1         SAB        IAB        Ectopic        Molar        Multiple   0    Live Births   1             s/p FAVD ppd# 1  1. Doing well, continue routine post-partum care. 2.  Desires discharge. Discharge instructions reviewed.

## 2023-03-21 NOTE — PLAN OF CARE
Problem: Pain  Goal: Verbalizes/displays adequate comfort level or baseline comfort level  Outcome: Completed     Problem: Postpartum  Goal: Experiences normal postpartum course  Description:  Postpartum OB-Pregnancy care plan goal which identifies if the mother is experiencing a normal postpartum course  Outcome: Completed  Goal: Appropriate maternal -  bonding  Description:  Postpartum OB-Pregnancy care plan goal which identifies if the mother and  are bonding appropriately  Outcome: Completed  Goal: Establishment of infant feeding pattern  Description:  Postpartum OB-Pregnancy care plan goal which identifies if the mother is establishing a feeding pattern with their   Outcome: Completed  Goal: Incisions, wounds, or drain sites healing without S/S of infection  Outcome: Completed     Problem: Infection - Adult  Goal: Absence of infection during hospitalization  Outcome: Completed     Problem: Safety - Adult  Goal: Free from fall injury  Outcome: Completed     Problem: Discharge Planning  Goal: Discharge to home or other facility with appropriate resources  Outcome: Completed

## 2023-03-21 NOTE — PROGRESS NOTES
ID bands checked. Mother's ID band and one of baby's ID bands removed and taped to discharge instruction sheet, signed by patient and witnessed by RN. Discharged in wheelchair, holding baby in car seat on lap. Patient discharged in stable condition accompanied by fob. Patient verbalizes understanding of discharge instructions and denies further questions.  Prescriptions given

## 2024-08-23 LAB
ABO, EXTERNAL RESULT: NORMAL
C. TRACHOMATIS, EXTERNAL RESULT: NEGATIVE
GBS, EXTERNAL RESULT: NEGATIVE
HEP B, EXTERNAL RESULT: NEGATIVE
HIV, EXTERNAL RESULT: NONREACTIVE
N. GONORRHOEAE, EXTERNAL RESULT: NEGATIVE
RH FACTOR, EXTERNAL RESULT: POSITIVE
RPR, EXTERNAL RESULT: NONREACTIVE
RUBELLA TITER, EXTERNAL RESULT: NORMAL

## 2025-02-21 ENCOUNTER — INITIAL PRENATAL (OUTPATIENT)
Dept: OBGYN CLINIC | Age: 25
End: 2025-02-21

## 2025-02-21 VITALS
SYSTOLIC BLOOD PRESSURE: 104 MMHG | HEART RATE: 65 BPM | BODY MASS INDEX: 30.52 KG/M2 | DIASTOLIC BLOOD PRESSURE: 62 MMHG | TEMPERATURE: 97.9 F | WEIGHT: 177.8 LBS

## 2025-02-21 DIAGNOSIS — Z34.83 PRENATAL CARE, SUBSEQUENT PREGNANCY IN THIRD TRIMESTER: Primary | ICD-10-CM

## 2025-02-21 DIAGNOSIS — F41.9 ANXIETY: ICD-10-CM

## 2025-02-21 PROCEDURE — 0500F INITIAL PRENATAL CARE VISIT: CPT | Performed by: OBSTETRICS & GYNECOLOGY

## 2025-02-21 RX ORDER — CETIRIZINE HYDROCHLORIDE 10 MG/1
TABLET ORAL
COMMUNITY

## 2025-02-28 PROBLEM — Z37.9 NORMAL LABOR: Status: RESOLVED | Noted: 2023-03-19 | Resolved: 2025-02-28

## 2025-02-28 RX ORDER — SERTRALINE HYDROCHLORIDE 25 MG/1
25 TABLET, FILM COATED ORAL DAILY
Qty: 30 TABLET | Refills: 3 | Status: SHIPPED | OUTPATIENT
Start: 2025-02-28

## 2025-03-02 SDOH — ECONOMIC STABILITY: INCOME INSECURITY: IN THE LAST 12 MONTHS, WAS THERE A TIME WHEN YOU WERE NOT ABLE TO PAY THE MORTGAGE OR RENT ON TIME?: NO

## 2025-03-02 SDOH — ECONOMIC STABILITY: FOOD INSECURITY: WITHIN THE PAST 12 MONTHS, THE FOOD YOU BOUGHT JUST DIDN'T LAST AND YOU DIDN'T HAVE MONEY TO GET MORE.: NEVER TRUE

## 2025-03-02 SDOH — ECONOMIC STABILITY: FOOD INSECURITY: WITHIN THE PAST 12 MONTHS, YOU WORRIED THAT YOUR FOOD WOULD RUN OUT BEFORE YOU GOT MONEY TO BUY MORE.: NEVER TRUE

## 2025-03-02 SDOH — ECONOMIC STABILITY: TRANSPORTATION INSECURITY
IN THE PAST 12 MONTHS, HAS LACK OF TRANSPORTATION KEPT YOU FROM MEETINGS, WORK, OR FROM GETTING THINGS NEEDED FOR DAILY LIVING?: NO

## 2025-03-02 SDOH — ECONOMIC STABILITY: TRANSPORTATION INSECURITY
IN THE PAST 12 MONTHS, HAS THE LACK OF TRANSPORTATION KEPT YOU FROM MEDICAL APPOINTMENTS OR FROM GETTING MEDICATIONS?: NO

## 2025-03-05 ENCOUNTER — ROUTINE PRENATAL (OUTPATIENT)
Dept: OBGYN CLINIC | Age: 25
End: 2025-03-05

## 2025-03-05 VITALS
BODY MASS INDEX: 30.63 KG/M2 | SYSTOLIC BLOOD PRESSURE: 114 MMHG | HEART RATE: 86 BPM | WEIGHT: 179.4 LBS | DIASTOLIC BLOOD PRESSURE: 62 MMHG | HEIGHT: 64 IN

## 2025-03-05 DIAGNOSIS — Z34.83 PRENATAL CARE, SUBSEQUENT PREGNANCY IN THIRD TRIMESTER: Primary | ICD-10-CM

## 2025-03-05 DIAGNOSIS — F41.9 ANXIETY: ICD-10-CM

## 2025-03-05 LAB — HEPATITIS C ANTIBODY, EXTERNAL RESULT: NEGATIVE

## 2025-03-05 PROCEDURE — 0502F SUBSEQUENT PRENATAL CARE: CPT | Performed by: OBSTETRICS & GYNECOLOGY

## 2025-03-05 ASSESSMENT — PATIENT HEALTH QUESTIONNAIRE - PHQ9
SUM OF ALL RESPONSES TO PHQ QUESTIONS 1-9: 0
2. FEELING DOWN, DEPRESSED OR HOPELESS: NOT AT ALL
1. LITTLE INTEREST OR PLEASURE IN DOING THINGS: NOT AT ALL
SUM OF ALL RESPONSES TO PHQ QUESTIONS 1-9: 0

## 2025-03-08 LAB — GP B STREP SPEC QL CULT: NORMAL

## 2025-03-10 ENCOUNTER — HOSPITAL ENCOUNTER (INPATIENT)
Age: 25
LOS: 2 days | Discharge: HOME | End: 2025-03-12
Attending: OBSTETRICS & GYNECOLOGY | Admitting: OBSTETRICS & GYNECOLOGY
Payer: COMMERCIAL

## 2025-03-10 ENCOUNTER — ANESTHESIA (OUTPATIENT)
Dept: LABOR AND DELIVERY | Age: 25
End: 2025-03-10
Payer: COMMERCIAL

## 2025-03-10 ENCOUNTER — ANESTHESIA EVENT (OUTPATIENT)
Dept: LABOR AND DELIVERY | Age: 25
End: 2025-03-10
Payer: COMMERCIAL

## 2025-03-10 ENCOUNTER — RESULTS FOLLOW-UP (OUTPATIENT)
Dept: OBGYN CLINIC | Age: 25
End: 2025-03-10

## 2025-03-10 ENCOUNTER — TELEPHONE (OUTPATIENT)
Dept: OBGYN CLINIC | Age: 25
End: 2025-03-10

## 2025-03-10 PROBLEM — Z37.9 NORMAL LABOR: Status: ACTIVE | Noted: 2025-03-10

## 2025-03-10 LAB
ABO + RH BLD: NORMAL
AMPHETAMINES UR QL SCN>1000 NG/ML: NORMAL
BARBITURATES UR QL SCN>200 NG/ML: NORMAL
BASOPHILS # BLD: 0 K/UL (ref 0–0.2)
BASOPHILS NFR BLD: 0.2 %
BENZODIAZ UR QL SCN>200 NG/ML: NORMAL
BLD GP AB SCN SERPL QL: NORMAL
BUPRENORPHINE+NOR UR QL SCN: NORMAL
CANNABINOIDS UR QL SCN>50 NG/ML: NORMAL
COCAINE UR QL SCN: NORMAL
DEPRECATED RDW RBC AUTO: 13.3 % (ref 12.4–15.4)
DRUG SCREEN COMMENT UR-IMP: NORMAL
EOSINOPHIL # BLD: 0.1 K/UL (ref 0–0.6)
EOSINOPHIL NFR BLD: 0.4 %
FENTANYL SCREEN, URINE: NORMAL
HCT VFR BLD AUTO: 38 % (ref 36–48)
HGB BLD-MCNC: 13.1 G/DL (ref 12–16)
LYMPHOCYTES # BLD: 1.2 K/UL (ref 1–5.1)
LYMPHOCYTES NFR BLD: 9.5 %
MCH RBC QN AUTO: 32.6 PG (ref 26–34)
MCHC RBC AUTO-ENTMCNC: 34.4 G/DL (ref 31–36)
MCV RBC AUTO: 94.7 FL (ref 80–100)
METHADONE UR QL SCN>300 NG/ML: NORMAL
MONOCYTES # BLD: 0.6 K/UL (ref 0–1.3)
MONOCYTES NFR BLD: 5.2 %
NEUTROPHILS # BLD: 10.5 K/UL (ref 1.7–7.7)
NEUTROPHILS NFR BLD: 84.7 %
OPIATES UR QL SCN>300 NG/ML: NORMAL
OXYCODONE UR QL SCN: NORMAL
PCP UR QL SCN>25 NG/ML: NORMAL
PH UR STRIP: 6 [PH]
PLATELET # BLD AUTO: 207 K/UL (ref 135–450)
PMV BLD AUTO: 8.1 FL (ref 5–10.5)
RBC # BLD AUTO: 4.02 M/UL (ref 4–5.2)
WBC # BLD AUTO: 12.4 K/UL (ref 4–11)

## 2025-03-10 PROCEDURE — 0HQ9XZZ REPAIR PERINEUM SKIN, EXTERNAL APPROACH: ICD-10-PCS | Performed by: OBSTETRICS & GYNECOLOGY

## 2025-03-10 PROCEDURE — 59400 OBSTETRICAL CARE: CPT | Performed by: OBSTETRICS & GYNECOLOGY

## 2025-03-10 PROCEDURE — 7200000001 HC VAGINAL DELIVERY

## 2025-03-10 PROCEDURE — 80307 DRUG TEST PRSMV CHEM ANLYZR: CPT

## 2025-03-10 PROCEDURE — 6370000000 HC RX 637 (ALT 250 FOR IP): Performed by: OBSTETRICS & GYNECOLOGY

## 2025-03-10 PROCEDURE — 6360000002 HC RX W HCPCS: Performed by: NURSE ANESTHETIST, CERTIFIED REGISTERED

## 2025-03-10 PROCEDURE — 86900 BLOOD TYPING SEROLOGIC ABO: CPT

## 2025-03-10 PROCEDURE — 86780 TREPONEMA PALLIDUM: CPT

## 2025-03-10 PROCEDURE — 86850 RBC ANTIBODY SCREEN: CPT

## 2025-03-10 PROCEDURE — 86901 BLOOD TYPING SEROLOGIC RH(D): CPT

## 2025-03-10 PROCEDURE — 51701 INSERT BLADDER CATHETER: CPT

## 2025-03-10 PROCEDURE — 2500000003 HC RX 250 WO HCPCS: Performed by: NURSE ANESTHETIST, CERTIFIED REGISTERED

## 2025-03-10 PROCEDURE — 85025 COMPLETE CBC W/AUTO DIFF WBC: CPT

## 2025-03-10 PROCEDURE — 1220000000 HC SEMI PRIVATE OB R&B

## 2025-03-10 PROCEDURE — 6360000002 HC RX W HCPCS: Performed by: OBSTETRICS & GYNECOLOGY

## 2025-03-10 RX ORDER — SODIUM CHLORIDE 9 MG/ML
25 INJECTION, SOLUTION INTRAVENOUS PRN
Status: DISCONTINUED | OUTPATIENT
Start: 2025-03-10 | End: 2025-03-10

## 2025-03-10 RX ORDER — DOCUSATE SODIUM 100 MG/1
100 CAPSULE, LIQUID FILLED ORAL 2 TIMES DAILY
Status: DISCONTINUED | OUTPATIENT
Start: 2025-03-10 | End: 2025-03-12 | Stop reason: HOSPADM

## 2025-03-10 RX ORDER — SODIUM CHLORIDE, SODIUM LACTATE, POTASSIUM CHLORIDE, CALCIUM CHLORIDE 600; 310; 30; 20 MG/100ML; MG/100ML; MG/100ML; MG/100ML
INJECTION, SOLUTION INTRAVENOUS CONTINUOUS
Status: DISCONTINUED | OUTPATIENT
Start: 2025-03-10 | End: 2025-03-12 | Stop reason: HOSPADM

## 2025-03-10 RX ORDER — ONDANSETRON 4 MG/1
4 TABLET, ORALLY DISINTEGRATING ORAL EVERY 6 HOURS PRN
Status: DISCONTINUED | OUTPATIENT
Start: 2025-03-10 | End: 2025-03-12 | Stop reason: HOSPADM

## 2025-03-10 RX ORDER — ONDANSETRON 2 MG/ML
4 INJECTION INTRAMUSCULAR; INTRAVENOUS EVERY 6 HOURS PRN
Status: DISCONTINUED | OUTPATIENT
Start: 2025-03-10 | End: 2025-03-12 | Stop reason: HOSPADM

## 2025-03-10 RX ORDER — SODIUM CHLORIDE, SODIUM LACTATE, POTASSIUM CHLORIDE, AND CALCIUM CHLORIDE .6; .31; .03; .02 G/100ML; G/100ML; G/100ML; G/100ML
500 INJECTION, SOLUTION INTRAVENOUS PRN
Status: DISCONTINUED | OUTPATIENT
Start: 2025-03-10 | End: 2025-03-10

## 2025-03-10 RX ORDER — SODIUM CHLORIDE 0.9 % (FLUSH) 0.9 %
5-40 SYRINGE (ML) INJECTION EVERY 12 HOURS SCHEDULED
Status: DISCONTINUED | OUTPATIENT
Start: 2025-03-10 | End: 2025-03-12 | Stop reason: HOSPADM

## 2025-03-10 RX ORDER — FAMOTIDINE 20 MG/1
20 TABLET, FILM COATED ORAL 2 TIMES DAILY PRN
Status: DISCONTINUED | OUTPATIENT
Start: 2025-03-10 | End: 2025-03-12 | Stop reason: HOSPADM

## 2025-03-10 RX ORDER — ONDANSETRON 4 MG/1
4 TABLET, ORALLY DISINTEGRATING ORAL EVERY 6 HOURS PRN
Status: DISCONTINUED | OUTPATIENT
Start: 2025-03-10 | End: 2025-03-10

## 2025-03-10 RX ORDER — ACETAMINOPHEN 500 MG
1000 TABLET ORAL EVERY 8 HOURS PRN
Status: DISCONTINUED | OUTPATIENT
Start: 2025-03-10 | End: 2025-03-12 | Stop reason: HOSPADM

## 2025-03-10 RX ORDER — DOCUSATE SODIUM 100 MG/1
100 CAPSULE, LIQUID FILLED ORAL 2 TIMES DAILY
Status: DISCONTINUED | OUTPATIENT
Start: 2025-03-10 | End: 2025-03-10

## 2025-03-10 RX ORDER — SODIUM CHLORIDE 9 MG/ML
INJECTION, SOLUTION INTRAVENOUS PRN
Status: DISCONTINUED | OUTPATIENT
Start: 2025-03-10 | End: 2025-03-12 | Stop reason: HOSPADM

## 2025-03-10 RX ORDER — SIMETHICONE 80 MG
80 TABLET,CHEWABLE ORAL EVERY 6 HOURS PRN
Status: DISCONTINUED | OUTPATIENT
Start: 2025-03-10 | End: 2025-03-12 | Stop reason: HOSPADM

## 2025-03-10 RX ORDER — SODIUM CHLORIDE, SODIUM LACTATE, POTASSIUM CHLORIDE, CALCIUM CHLORIDE 600; 310; 30; 20 MG/100ML; MG/100ML; MG/100ML; MG/100ML
INJECTION, SOLUTION INTRAVENOUS CONTINUOUS
Status: DISCONTINUED | OUTPATIENT
Start: 2025-03-10 | End: 2025-03-10

## 2025-03-10 RX ORDER — KETOROLAC TROMETHAMINE 30 MG/ML
30 INJECTION, SOLUTION INTRAMUSCULAR; INTRAVENOUS EVERY 6 HOURS PRN
Status: DISCONTINUED | OUTPATIENT
Start: 2025-03-10 | End: 2025-03-12 | Stop reason: HOSPADM

## 2025-03-10 RX ORDER — SODIUM CHLORIDE 0.9 % (FLUSH) 0.9 %
5-40 SYRINGE (ML) INJECTION PRN
Status: DISCONTINUED | OUTPATIENT
Start: 2025-03-10 | End: 2025-03-10

## 2025-03-10 RX ORDER — BENZOCAINE/MENTHOL 6 MG-10 MG
LOZENGE MUCOUS MEMBRANE
Status: DISCONTINUED | OUTPATIENT
Start: 2025-03-10 | End: 2025-03-12 | Stop reason: HOSPADM

## 2025-03-10 RX ORDER — SODIUM CHLORIDE 0.9 % (FLUSH) 0.9 %
5-40 SYRINGE (ML) INJECTION EVERY 12 HOURS SCHEDULED
Status: DISCONTINUED | OUTPATIENT
Start: 2025-03-10 | End: 2025-03-10

## 2025-03-10 RX ORDER — IBUPROFEN 800 MG/1
800 TABLET, FILM COATED ORAL EVERY 8 HOURS PRN
Status: DISCONTINUED | OUTPATIENT
Start: 2025-03-10 | End: 2025-03-12 | Stop reason: HOSPADM

## 2025-03-10 RX ORDER — BUPIVACAINE HYDROCHLORIDE 2.5 MG/ML
INJECTION, SOLUTION EPIDURAL; INFILTRATION; INTRACAUDAL
Status: DISCONTINUED | OUTPATIENT
Start: 2025-03-10 | End: 2025-03-10 | Stop reason: SDUPTHER

## 2025-03-10 RX ORDER — ONDANSETRON 2 MG/ML
4 INJECTION INTRAMUSCULAR; INTRAVENOUS EVERY 6 HOURS PRN
Status: DISCONTINUED | OUTPATIENT
Start: 2025-03-10 | End: 2025-03-10

## 2025-03-10 RX ORDER — SODIUM CHLORIDE 0.9 % (FLUSH) 0.9 %
5-40 SYRINGE (ML) INJECTION PRN
Status: DISCONTINUED | OUTPATIENT
Start: 2025-03-10 | End: 2025-03-12 | Stop reason: HOSPADM

## 2025-03-10 RX ORDER — FERROUS SULFATE 325(65) MG
325 TABLET ORAL
Status: DISCONTINUED | OUTPATIENT
Start: 2025-03-11 | End: 2025-03-12 | Stop reason: HOSPADM

## 2025-03-10 RX ORDER — ACETAMINOPHEN 325 MG/1
650 TABLET ORAL EVERY 4 HOURS PRN
Status: DISCONTINUED | OUTPATIENT
Start: 2025-03-10 | End: 2025-03-10

## 2025-03-10 RX ADMIN — Medication 15 ML/HR: at 18:19

## 2025-03-10 RX ADMIN — SERTRALINE 25 MG: 50 TABLET, FILM COATED ORAL at 22:42

## 2025-03-10 RX ADMIN — BENZOCAINE AND LEVOMENTHOL: 200; 5 SPRAY TOPICAL at 22:26

## 2025-03-10 RX ADMIN — IBUPROFEN 800 MG: 800 TABLET, FILM COATED ORAL at 22:25

## 2025-03-10 RX ADMIN — WITCH HAZEL: 500 SOLUTION RECTAL; TOPICAL at 22:25

## 2025-03-10 RX ADMIN — Medication 87.3 MILLI-UNITS/MIN: at 19:36

## 2025-03-10 RX ADMIN — BUPIVACAINE HYDROCHLORIDE 1 ML: 2.5 INJECTION, SOLUTION EPIDURAL; INFILTRATION; INTRACAUDAL; PERINEURAL at 18:10

## 2025-03-10 ASSESSMENT — PAIN SCALES - GENERAL: PAINLEVEL_OUTOF10: 0

## 2025-03-10 NOTE — TELEPHONE ENCOUNTER
Pt called in and stated she is going to triage to be evaluated because she is in labor. She said her contractions started around 7am this morning and that they have gotten closer. 4-5 minutes apart and are lasting around 45 seconds each. Water has not broke. Baby is moving. No bleeding.     Spoke with L + D triage and let them know she is coming. They are wanting to know if she was checked on the 5th. Note still open. Please advise.

## 2025-03-10 NOTE — ANESTHESIA PROCEDURE NOTES
CSE Block    Patient location during procedure: OB  Start time: 3/10/2025 6:05 PM  End time: 3/10/2025 6:19 PM  Reason for block: labor epidural  Staffing  Performed: resident/CRNA   Resident/CRNA: Breana Verde APRN - VIOLA  Performed by: Breana Verde APRN - CRNA  Authorized by: Justin Ruiz MD    CSE  Patient position: sitting  Prep: ChloraPrep and site prepped and draped  Patient monitoring: continuous pulse ox and frequent blood pressure checks  Approach: midline  Provider prep: mask and sterile gloves  Spinal Needle  Needle type: pencil-tip   Needle gauge: 25 G  Needle length: 4.75 in  Epidural Needle  Injection technique: LARRY saline  Needle type: Tuohy   Needle gauge: 17 G  Needle length: 3.5 in  Needle insertion depth: 5.5 cm  Location: lumbar (1-5)  Catheter  Catheter type: side hole  Catheter size: 19 G  Catheter at skin depth: 11 cm  Test dose: negative (lidcoaine 1.5% + epi 1:200k given incrementally for total of 5ml)  Assessment  Hemodynamics: stable  Additional Notes  Patient in sitting position.  Sterile prep and drape applied to back.  Skin localization with 5ml of lidocaine 1%.  LORT with NS, CSE with 25g pencan- return of clear free flowing csf.  Epidural catheter advanced easily.  Test dose given incrementally after negative aspirate from catheter.  Sterile dressing applied.  Patient tolerated procedure well.  Preanesthetic Checklist  Completed: patient identified, IV checked, site marked, risks and benefits discussed, surgical/procedural consents, equipment checked, pre-op evaluation, timeout performed, anesthesia consent given, oxygen available, monitors applied/VS acknowledged and blood product R/B/A discussed and consented

## 2025-03-10 NOTE — ANESTHESIA PRE PROCEDURE
Department of Anesthesiology  Preprocedure Note       Name:  Alex Julio   Age:  25 y.o.  :  2000                                          MRN:  [id number]         Date:  3/10/2025      Surgeon: * No surgeons listed *    Procedure: * No procedures listed *    Medications prior to admission:   Prior to Admission medications    Medication Sig Start Date End Date Taking? Authorizing Provider   sertraline (ZOLOFT) 25 MG tablet Take 1 tablet by mouth daily 25   Ronit Elam DO   cetirizine (ZYRTEC ALLERGY) 10 MG tablet     ProviderJimena MD   Prenatal MV-Min-Fe Fum-FA-DHA (PRENATAL 1 PO) Take by mouth    ProviderJimena MD       Current medications:    Current Facility-Administered Medications   Medication Dose Route Frequency Provider Last Rate Last Admin    sodium chloride 0.9 % 200 mL with fentaNYL 500 mcg, BUPivacaine 0.5% 50 mL (OB) epidural                Allergies:  No Known Allergies    Problem List:    Patient Active Problem List   Diagnosis Code    Dysfunctional uterine bleeding N93.8    Acne L70.9    Need for HPV vaccination Z23    Forceps delivery with baby delivered O75.9       Past Medical History:        Diagnosis Date    Mental disorder        Past Surgical History:        Procedure Laterality Date    WISDOM TOOTH EXTRACTION Bilateral        Social History:    Social History     Tobacco Use    Smoking status: Never    Smokeless tobacco: Never   Substance Use Topics    Alcohol use: No                                Counseling given: Not Answered      Vital Signs (Current):   Vitals:    03/10/25 1625   BP: 120/80   Pulse: 85   Resp: 18   SpO2: 99%   Weight: 81.6 kg (180 lb)   Height: 1.626 m (5' 4\")                                              BP Readings from Last 3 Encounters:   03/10/25 120/80   25 114/62   25 104/62       NPO Status:                                                                                 BMI:   Wt Readings from Last 3

## 2025-03-10 NOTE — FLOWSHEET NOTE
Patient presents to triage with c/o of contractions that started at 0700 and have gradually gotten worse.  Placed on EFM and VSS.  SVE 4/70/-2.  Patient denies any LOF.  Will update Dr Cruz on patients arrival

## 2025-03-11 PROBLEM — F41.9 ANXIETY: Status: ACTIVE | Noted: 2025-03-11

## 2025-03-11 LAB
BASOPHILS # BLD: 0 K/UL (ref 0–0.2)
BASOPHILS NFR BLD: 0.2 %
DEPRECATED RDW RBC AUTO: 13.5 % (ref 12.4–15.4)
EOSINOPHIL # BLD: 0.1 K/UL (ref 0–0.6)
EOSINOPHIL NFR BLD: 0.4 %
HCT VFR BLD AUTO: 38.5 % (ref 36–48)
HGB BLD-MCNC: 13.2 G/DL (ref 12–16)
LYMPHOCYTES # BLD: 1.4 K/UL (ref 1–5.1)
LYMPHOCYTES NFR BLD: 9.8 %
MCH RBC QN AUTO: 32.9 PG (ref 26–34)
MCHC RBC AUTO-ENTMCNC: 34.4 G/DL (ref 31–36)
MCV RBC AUTO: 95.7 FL (ref 80–100)
MONOCYTES # BLD: 1.1 K/UL (ref 0–1.3)
MONOCYTES NFR BLD: 8 %
NEUTROPHILS # BLD: 11.7 K/UL (ref 1.7–7.7)
NEUTROPHILS NFR BLD: 81.6 %
PLATELET # BLD AUTO: 203 K/UL (ref 135–450)
PMV BLD AUTO: 8.5 FL (ref 5–10.5)
RBC # BLD AUTO: 4.02 M/UL (ref 4–5.2)
REAGIN+T PALLIDUM IGG+IGM SERPL-IMP: NORMAL
WBC # BLD AUTO: 14.3 K/UL (ref 4–11)

## 2025-03-11 PROCEDURE — 99024 POSTOP FOLLOW-UP VISIT: CPT | Performed by: OBSTETRICS & GYNECOLOGY

## 2025-03-11 PROCEDURE — 1220000000 HC SEMI PRIVATE OB R&B

## 2025-03-11 PROCEDURE — 2500000003 HC RX 250 WO HCPCS: Performed by: OBSTETRICS & GYNECOLOGY

## 2025-03-11 PROCEDURE — 85025 COMPLETE CBC W/AUTO DIFF WBC: CPT

## 2025-03-11 PROCEDURE — 6370000000 HC RX 637 (ALT 250 FOR IP): Performed by: OBSTETRICS & GYNECOLOGY

## 2025-03-11 PROCEDURE — 36415 COLL VENOUS BLD VENIPUNCTURE: CPT

## 2025-03-11 RX ORDER — TERBUTALINE SULFATE 1 MG/ML
INJECTION SUBCUTANEOUS
Status: DISPENSED
Start: 2025-03-11 | End: 2025-03-12

## 2025-03-11 RX ADMIN — IBUPROFEN 800 MG: 800 TABLET, FILM COATED ORAL at 22:06

## 2025-03-11 RX ADMIN — SERTRALINE 25 MG: 50 TABLET, FILM COATED ORAL at 22:06

## 2025-03-11 RX ADMIN — Medication 10 ML: at 09:11

## 2025-03-11 RX ADMIN — DOCUSATE SODIUM 100 MG: 100 CAPSULE, LIQUID FILLED ORAL at 22:05

## 2025-03-11 RX ADMIN — ACETAMINOPHEN 1000 MG: 500 TABLET ORAL at 17:06

## 2025-03-11 RX ADMIN — DOCUSATE SODIUM 100 MG: 100 CAPSULE, LIQUID FILLED ORAL at 09:10

## 2025-03-11 RX ADMIN — IBUPROFEN 800 MG: 800 TABLET, FILM COATED ORAL at 09:10

## 2025-03-11 RX ADMIN — FERROUS SULFATE TAB 325 MG (65 MG ELEMENTAL FE) 325 MG: 325 (65 FE) TAB at 09:10

## 2025-03-11 ASSESSMENT — PAIN DESCRIPTION - DESCRIPTORS: DESCRIPTORS: DISCOMFORT

## 2025-03-11 ASSESSMENT — PAIN SCALES - GENERAL
PAINLEVEL_OUTOF10: 0
PAINLEVEL_OUTOF10: 2
PAINLEVEL_OUTOF10: 1

## 2025-03-11 ASSESSMENT — PAIN DESCRIPTION - LOCATION: LOCATION: BACK

## 2025-03-11 ASSESSMENT — PAIN - FUNCTIONAL ASSESSMENT: PAIN_FUNCTIONAL_ASSESSMENT: ACTIVITIES ARE NOT PREVENTED

## 2025-03-11 NOTE — PROGRESS NOTES
Pt assisted to bathroom with assist x 2.  Pt tolerates well and able to urinate without difficulty.  Maria Isabel care instructions given to pt.  Pt verbalizes understanding of.  Returns to bed without difficulty.

## 2025-03-11 NOTE — PLAN OF CARE
Problem: Pain  Goal: Verbalizes/displays adequate comfort level or baseline comfort level  3/10/2025 2139 by Brennan Kendrick, RN  Outcome: Completed

## 2025-03-11 NOTE — L&D DELIVERY SUMMARY NOTE
Bed: 19  Expected date:   Expected time:   Means of arrival:   Comments:  Seizure x 3    Department of Obstetrics and Gynecology  Spontaneous Vaginal Delivery Note      Pre-operative Diagnosis:  Term pregnancy, Spontaneous labor, Single fetus, and Pregnancy complicated by: previous FAVD, anxiety    Post-operative Diagnosis:  Living  infant(s) and Female    Information for the patient's :  Sally Julio [[id number]]                  Infant Wt:   Information for the patient's :  Sally Julio [[id number]]         APGARS:     Information for the patient's :  Sally Julio [[id number]]         Anesthesia:  epidural anesthesia    Application and Delivery:    26 y/o  female at 37 weeks 2 days gestation presented to L&D in active labor.  Patient was admitted.  Epidural was administered.  SROM occurred for large amount of clear fluid.  Patient immediately progressed to complete dilatation and effacement after SROM. Patient pushed for 2 sets of 3 pushes effectively and delivered a viable female  from an KHUSHBOO presentation over and intact perineum.  No nuchal cord was noted.  Shoulders and body delivered immediately after the head and  was placed on maternal abdomen for suctioning and drying.  After appropriate delay, cord was clamped and cut.  A segment of cord (portions of cord were noted to be hypercoiled), was collected but later discarded due to fetal wellbeing.  Cord blood was collected. The placenta delivered spontaneously intact with 3 vessel cord.  The lower uterine segment was cleared of all clots and debris.  The fundus was found to be firm and hemostasis assured.  No vaginal, labial or cervical lacerations were noted.  A first degree perineal laceration was repaired in the usual fashion with 3-0 Vicryl.  Hemostasis was assured.  A straight catheterization was performed for 200 cc clear yellow urine.  Rectum was found to be intact.  Sponge, lap and needle counts were correct x 2.  Patient tolerated the procedure well and was

## 2025-03-11 NOTE — PROGRESS NOTES
Post Partum Progress Note    Subjective:  Alex Julio is a 25 y.o.  status-post  uncomplicated Vaginal Delivery. The pregnancy was complicated by  anxiety and previous forceps assisted vaginal delivery .     Patient is doing well with no concerns.  Pain is well controlled with current regimen.  Lochia moderate.  Tolerating regular diet without difficulty.  Ambulating without difficulty, denies dizziness on standing.  Voiding without difficulty.  She is breast feeding.  Denies chest pain, SOB, or leg pain.    Objective:  BP (!) 102/56   Pulse 75   Temp 98.4 °F (36.9 °C) (Oral)   Resp 18   Ht 1.626 m (5' 4\")   Wt 81.6 kg (180 lb)   SpO2 97%   Breastfeeding Unknown   BMI 30.90 kg/m²     GENERAL APPEARANCE: alert, well appearing, in no apparent distress  LUNGS: CTAB, nonlabored respirations  HEART: regular rate and rhythm  ABDOMEN POSTPARTUM: soft, nontender, fundus firm below U  EXTREMITIES: no redness or tenderness in the calves or thighs, no edema    I/O last 3 completed shifts:  In: 758.2 [I.V.:758.2]  Out: 846 [Urine:700; Blood:146]    Pertinent Labs:   CBC:   Recent Labs     03/10/25  1649 25  0600   WBC 12.4* 14.3*   HGB 13.1 13.2   HCT 38.0 38.5    203     BMP:  No results for input(s): \"NA\", \"K\", \"CL\", \"CO2\", \"BUN\", \"CREATININE\", \"GLUCOSE\" in the last 72 hours.  Hepatic: No results for input(s): \"AST\", \"ALT\", \"BILITOT\", \"ALKPHOS\" in the last 72 hours.    Invalid input(s): \"ALB\"  Mag:    No results for input(s): \"MG\" in the last 72 hours.   Phos:   No results for input(s): \"PHOS\" in the last 72 hours.   INR: No results for input(s): \"INR\" in the last 72 hours.    Assessment/Plan:  Alex Julio is a 25 y.o.  status-post  uncomplicated Vaginal Delivery.     1. PPD #1: doing well, routine care    2. Infant: F, , 2846g    3. Labs: A+, RI, GBS neg    4. Anxiety  - on zoloft prior to pregnancy, monitor closely PP    Dispo: d/c home PPD2 pending clinical status    Rahel

## 2025-03-11 NOTE — H&P
Department of Obstetrics and Gynecology  Attending Obstetrics History and Physical        CHIEF COMPLAINT:  contractions    HISTORY OF PRESENT ILLNESS:      The patient is a 25 y.o.  2 parity 1001 at 37 weeks 2 days gestation with EDC 3/29/25 who presents for regular contractions.  First noted contractions this morning at 7 am--spaced out. Contractions intensified and became more frequent--every 4-5 minutes prompting evaluation in triage.  Patient was found to be 4 cm with regular painful contractions--admitted for normal labor.  Denies vaginal bleeding, loss of fluid and decreased fetal movement.   Patient is a transfer of care from [facility].   Pregnancy is complicated by anxiety and previous forceps assisted vaginal delivery.     DATES:    Last Menstrual Period:  24  Estimated Due Date:  3/29/25    PRENATAL CARE:    Provider:  DRISS Elam D.O.     Blood Type/Rh:  A   Antibody Screen:  positive  Rubella:  immune  RPR:  non-reactive  Hepatitis B Surface Antigen: negative  HIV:  non-reactive  Gonorrhea:  negative  Chlamydia:  negative  MSAFP/Multiple Markers:  Date:  ; Results:    U/S Structural Survery:  see report  1 hour Glucose Tolerance Test:  97  Group B Strep:  negative      PAST OB HISTORY        Depression:  No, anxiety      Post-partum depression:  No      Diabetes:  No      Gestational Diabetes:  No      Thyroid Disease:  No      Chronic HTN:  No      Gestation HTN:  No      Pre-eclampsia:  No      Seizure disorder:  No      Asthma:  No      Clotting disorder:  No      :  No      Tubal ligation:  No      D & C:  No      Cerclage:  No      LEEP:  No      Myomectomy:  No    OB History    Para Term  AB Living   2 1 1   1   SAB IAB Ectopic Molar Multiple Live Births       0 1      # Outcome Date GA Lbr Gilbert/2nd Weight Sex Type Anes PTL Lv   2 Current            1 Term 23 38w1d / 02:46 2.855 kg (6 lb 4.7 oz) M Vag-Forceps EPI N QUINTON     Past Gynecological History:

## 2025-03-11 NOTE — ANESTHESIA POSTPROCEDURE EVALUATION
Department of Anesthesiology  Postprocedure Note    Patient: Alex Julio  MRN: [id number]  YOB: 2000  Date of evaluation: 3/11/2025    Procedure Summary       Date: 03/10/25 Room / Location:     Anesthesia Start: 1800 Anesthesia Stop: 1932    Procedure: Labor Analgesia Diagnosis:     Scheduled Providers:  Responsible Provider: Justin Ruiz MD    Anesthesia Type: CSE ASA Status: 2            Anesthesia Type: No value filed.    Jaleel Phase I: Jaleel Score: 10    Jaleel Phase II: Jaleel Score: 10    Anesthesia Post Evaluation    Patient location during evaluation: bedside  Patient participation: complete - patient participated  Level of consciousness: awake and alert  Nausea & Vomiting: no nausea and no vomiting  Cardiovascular status: hemodynamically stable  Hydration status: stable  Pain management: adequate and satisfactory to patient        No notable events documented.

## 2025-03-12 VITALS
TEMPERATURE: 98.1 F | HEART RATE: 83 BPM | WEIGHT: 180 LBS | RESPIRATION RATE: 18 BRPM | DIASTOLIC BLOOD PRESSURE: 75 MMHG | BODY MASS INDEX: 30.73 KG/M2 | SYSTOLIC BLOOD PRESSURE: 113 MMHG | OXYGEN SATURATION: 98 % | HEIGHT: 64 IN

## 2025-03-12 PROCEDURE — 99024 POSTOP FOLLOW-UP VISIT: CPT | Performed by: OBSTETRICS & GYNECOLOGY

## 2025-03-12 PROCEDURE — 6370000000 HC RX 637 (ALT 250 FOR IP): Performed by: OBSTETRICS & GYNECOLOGY

## 2025-03-12 RX ORDER — IBUPROFEN 800 MG/1
800 TABLET, FILM COATED ORAL EVERY 8 HOURS PRN
Qty: 60 TABLET | Refills: 0 | Status: SHIPPED | OUTPATIENT
Start: 2025-03-12

## 2025-03-12 RX ADMIN — DOCUSATE SODIUM 100 MG: 100 CAPSULE, LIQUID FILLED ORAL at 09:12

## 2025-03-12 RX ADMIN — BENZOCAINE AND LEVOMENTHOL: 200; 5 SPRAY TOPICAL at 09:12

## 2025-03-12 RX ADMIN — IBUPROFEN 800 MG: 800 TABLET, FILM COATED ORAL at 09:11

## 2025-03-12 RX ADMIN — WITCH HAZEL: 500 SOLUTION RECTAL; TOPICAL at 09:12

## 2025-03-12 ASSESSMENT — PAIN SCALES - GENERAL: PAINLEVEL_OUTOF10: 1

## 2025-03-12 NOTE — PROGRESS NOTES
Return OB Office Visit    CC:   Chief Complaint   Patient presents with    Routine Prenatal Visit       HPI:  Patient seen and examined. Doing well today without complaints    Denies VB, LOF, ctx. Reports some intermittent pelvic pain.  +FM.  Denies headaches, vision changes, RUQ pain, increased LE edema.  Denies chest pain, shortness of breath, fever, chills, nausea, vomiting.  Reports leaking urine, has to wear a liner all the time.     Review of Systems: The following ROS was otherwise negative, except as noted in the HPI: constitutional, HEENT, respiratory, cardiovascular, gastrointestinal, genitourinary, skin, musculoskeletal, neurological, psych    Objective:  /62   Pulse 86   Ht 1.626 m (5' 4\")   Wt 81.4 kg (179 lb 6.4 oz)   BMI 30.79 kg/m²     Physical Exam  Vitals reviewed.   Constitutional:       General: She is not in acute distress.     Appearance: She is well-developed.   HENT:      Head: Normocephalic and atraumatic.   Eyes:      Conjunctiva/sclera: Conjunctivae normal.   Cardiovascular:      Rate and Rhythm: Normal rate.   Pulmonary:      Effort: Pulmonary effort is normal. No respiratory distress.   Abdominal:      General: There is no distension.      Palpations: Abdomen is soft.      Tenderness: There is no abdominal tenderness. There is no guarding or rebound.   Genitourinary:     General: Normal vulva.      Vagina: No vaginal discharge (negative ferning, pooling, nitrazine).   Musculoskeletal:         General: No swelling.   Skin:     General: Skin is warm and dry.   Neurological:      Mental Status: She is alert and oriented to person, place, and time.   Psychiatric:         Mood and Affect: Mood normal.         Behavior: Behavior normal.         Thought Content: Thought content normal.         FHR: 137 bpm by doppler    Assessment/Plan:   Alex Julio is a 25 y.o.  at 36w4d who presents for routine OB visit    1. Prenatal care, subsequent pregnancy in third trimester     -

## 2025-03-12 NOTE — PLAN OF CARE
Problem: Postpartum  Goal: Experiences normal postpartum course  Description:  Postpartum OB-Pregnancy care plan goal which identifies if the mother is experiencing a normal postpartum course  3/11/2025 2226 by Shayy Turcios RN  Outcome: Progressing  3/11/2025 0931 by Denise Lara RN  Outcome: Progressing  Goal: Appropriate maternal -  bonding  Description:  Postpartum OB-Pregnancy care plan goal which identifies if the mother and  are bonding appropriately  3/11/2025 2226 by Shayy Turcios RN  Outcome: Progressing  3/11/2025 0931 by Denise Lara RN  Outcome: Progressing  Goal: Establishment of infant feeding pattern  Description:  Postpartum OB-Pregnancy care plan goal which identifies if the mother is establishing a feeding pattern with their   Outcome: Progressing     Problem: Safety - Adult  Goal: Free from fall injury  Outcome: Progressing     Problem: Discharge Planning  Goal: Discharge to home or other facility with appropriate resources  Outcome: Progressing     Problem: Chronic Conditions and Co-morbidities  Goal: Patient's chronic conditions and co-morbidity symptoms are monitored and maintained or improved  Outcome: Progressing

## 2025-03-12 NOTE — LACTATION NOTE
This note was copied from a baby's chart.  LACTATION CONSULTATION      Follow-up Consult: Reason for Follow-up: Assess needs , Provide education, and Discharge education      : No    MOB states that infant is breastfeeding well. Feeding about every 1-2 hours overnight. Denies concerns.      Name: Girl Alex Julio       MRN: [id number]               YOB: 2027   Time of Birth: 7:32 PM   Gestational age: Gestational Age: 37w2d   Birth Weight: Birth Weight: 2.846 kg (6 lb 4.4 oz) Most Recent Weight: Weight: 2.846 kg (6 lb 4.4 oz) (Filed from Delivery Summary)   Weight Change from Birth: 0%            Maternal Assessment:      Maternal Data:   Information for the patient's mother:  Alex Julio [[id number]]   27 y.o.   /Para:   Information for the patient's mother:  Sumanth Alex [[id number]]       Information for the patient's mother:  Sumanth Alex [[id number]]   37w2d         Breast Assessment  Right Breast: Breasts not assessed this encounter     Left Breast: Breasts not assessed this encounter      Infant Assessment:      DOL:  13 hours      Feeding: Breastfeeding      I&O adequacy:  Urine output: is established  Stool output: is established  Percent weight change from birthweight: 0%     Oral Assessment: Oral assessment not completed at this time.     Intervention during consultation:     Interventions Performed:   Education     Latch & Positioning: Encouraged to call for latch check as needed during her stay.    Manual Expression:  MOB states she understands     Bedside Breast Pump:   N/A    Pump Arrangements:  Owns breast pump    Pump Distributed: No     Education:  Latch & positioning , Feeding frequency & feeding cues , Discharge breastfeeding education , Breastfeeding Booklet [facility] , and [facility]           Action/Plan:  Breastfeed on cue and at least 8 times/24 hours, unlimited timing. 
This note was copied from a baby's chart.  Lactation Progress Note      Data:   Follow up with experienced breast feeder with infant who is now 24 hours old. Mother says feeding has been going well. States latching has been comfortable, baby has been feeding often and has great output.    Action: Introduced self, number on whiteboard. Reminded of increasing frequency of feeding overnight and tomorrow. Stressed importance of deep comfortable latch and ways to achieve this. Discussed ways to know infant is getting enough including output, weight changes and satisfaction cues.     Response: Mother is confident and seems pleased with how feeding has been going. Encouraged to call for follow up as needed.  
Arrangements:  Owns breast pump Speedy      Education:  Latch & positioning , Feeding frequency & feeding cues , Exclusive breastfeeding , Breastfeeding Booklet reviewed , No artificial nipples , Risks of formula feeding , Expected intake and output , Feeding and diaper log , Skin to skin , Engorgement prevention & management , Electric breast pump , Milk storage guidelines , Hand expression , [facility] , [facility] , and Encouraged to reach out for lactation support.     Action/Plan:  Breastfeed on cue and at least 8 times/24 hours, unlimited timing. May not nurse this often in the first 24 hours. Wake baby and offer breastfeeding if it has been 3 hours since the beginning of last feeding. Place infant skin to skin if infant will not breastfeed at 3 hours.   Hand express prior to latch to domitila nipple and entice infant to the breast.   It is important to use gentle stimulation during feeding to promote active eating. Offer both breasts at every feeding. Burp infant in between sides. Alternate which breast is used first.   Offer STS often while awake. Mother holding infant skin to skin between feedings will promote milk supply and allow infant to rest more deeply.   Maintain a feeding log until infant is gaining weight and seen by primary care physician.   Request breastfeeding assistance from LC or RN as needed.     Feeding Plan reviewed with: Parents     Response:   Verbalized understanding of education and instruction

## 2025-03-12 NOTE — PROGRESS NOTES
[facility] Ob/Gyn  Post Partum Progress Note    Subjective:   Alex Julio is a 25 y.o.  s/p  at 37w2d, PPD #2.     Pregnancy is complicated by anxiety and previous forceps assisted vaginal delivery.       Patient is doing well today without complaints.  Reports lochia is decreasing.  Pain is well controlled.  Tolerating regular diet without nausea or vomiting.  Ambulating without difficulty, denies dizziness on standing.  Voiding without difficulty.  Denies headache, vision changes, RUQ pain.  Denies chest pain, shortness of breath, fever, chills, calf pain.  Breast feeding.     Postpartum Depression: Low Risk  (3/11/2025)    [place]  Depression Scale     Last EPDS Total Score: 0     Last EPDS Self Harm Result: Never          Objective:   Vitals:    25 0910   BP: 113/75   Pulse: 83   Resp: 18   Temp: 98.1 °F (36.7 °C)   SpO2: 98%      GENERAL APPEARANCE: alert, well appearing, in no apparent distress  LUNGS: clear to auscultation, no wheezes, rales or rhonchi, symmetric air entry  HEART: regular rate and rhythm, no murmurs  ABDOMEN POSTPARTUM: soft, non-tender, non-distended.  No rebound or guarding.  Fundus is firm and non-tender to palpation.    EXTREMITIES: no redness or tenderness in the calves or thighs, no edema  SKIN: normal coloration and turgor, no rashes  NEUROLOGIC: alert, oriented, normal speech, no focal findings or movement disorder noted    Impression: S/P Vaginal Delivery    Pertinent Labs:   Lab Results   Component Value Date    WBC 14.3 (H) 2025    HGB 13.2 2025    HCT 38.5 2025    MCV 95.7 2025     2025      Assessment / Plan:  Alex Julio is a 25 y.o.  s/p  at 37w2d     PPD #1     - Doing well without complaints     - Meeting milestones     - Hgb appropriate     - Routine postpartum care    A pos / RI / GBS neg    Anxiety     - No complaints today    Breast feeding     - Lactation support as needed    Infant

## 2025-03-12 NOTE — PLAN OF CARE
Problem: Postpartum  Goal: Experiences normal postpartum course  Description:  Postpartum OB-Pregnancy care plan goal which identifies if the mother is experiencing a normal postpartum course  3/12/2025 0925 by Denise Lara RN  Outcome: Progressing  3/11/2025 2226 by Shayy Turcios RN  Outcome: Progressing  Goal: Appropriate maternal -  bonding  Description:  Postpartum OB-Pregnancy care plan goal which identifies if the mother and  are bonding appropriately  3/12/2025 0925 by Denise Lara RN  Outcome: Progressing  3/11/2025 2226 by Shayy Turcios RN  Outcome: Progressing  Goal: Establishment of infant feeding pattern  Description:  Postpartum OB-Pregnancy care plan goal which identifies if the mother is establishing a feeding pattern with their   3/12/2025 0925 by Denise Lara RN  Outcome: Progressing  3/11/2025 2226 by Shayy Turcios, RN  Outcome: Progressing  Goal: Incisions, wounds, or drain sites healing without S/S of infection  Outcome: Progressing

## 2025-03-12 NOTE — DISCHARGE INSTRUCTIONS
Thank you for the opportunity to care for you and your family.    We hope that you are happy with the care we provided during your stay in the [facility]. We want to ensure that you have the help you need when you leave the hospital. If there is anything we can assist you with, please let us know.    Breastfeeding mothers may contact our lactation specialists with any problems or questions.  [facility] lact[facility] services phone number is [phone].  Leave a message and your call will be returned.    Please refer to the information provided in the postpartum care booklet.  The following are warning signs to remember.    Call 911 if you have:    Chest pain or pressure  Shortness of breath, even at rest  Thoughts of harming yourself or others  Seizures    Call your healthcare provider if you have:    Temperature of 100.4 degrees or higher  Stitches that are not healing        -- Swelling, bleeding, drainage, foul odor, redness or warmth in/around your           stitches, staples, or incision (scar)        -- Bad smelling blood or discharge from the vagina  Vaginal bleeding that has increased         -- Soaking through one pad in an hour        -- You are passing clots larger than the size of a lemon  Red, warm tender area(s) in your breast or calf  Headache that does not get better, even after taking medicine; or headache with vision changes    Remember to notify all healthcare providers from your date of delivery to up to one year after giving birth!    CARING FOR YOURSELF        DIET/ACTIVITY    Eat a well balanced diet focusing on foods high in fiber and protein.  Drink plenty of fluids, especially water.  To avoid constipation you may take a mild stool softener as recommended by your doctor or midwife.  Gradually increase your activity. Resume an exercise regime only after being advised by your doctor or midwife.  When sitting or lying down, keep your legs elevated to reduce swelling.  Avoid

## 2025-03-12 NOTE — DISCHARGE SUMMARY
[facility] Ob/Gyn  Obstetric Discharge Summary        Admitting Diagnosis:   Alex Julio is a 25 y.o.  at 37w2d with spontaneous labor  A pos / RI / GBS neg  Anxiety    Discharge Diagnosis:  Alex Julio is a 25 y.o.  at 37w2d with spontaneous labor  A pos / RI / GBS neg  Anxiety    Procedures:   1.     Referrals:    - Lactation Consultant       Information:      - Delivery date/time: 3/10/2025 at 1932     - Gender: Female     - Weight 6lbs 4.4 oz     - Apgars 9 & 9     Discharge Instructions:  Please call for increased pain not controlled by pain medications, significant vaginal bleeding greater than 1 pad/hour, temperature greater than 101 degrees F or any other concerns. Plan to follow up in 2 weeks.      Diet:common adult    Activity:  Increase activity gradually.  No heavy lifting or driving for 2 weeks.  Pelvic rest for 6 weeks.  No intercourse, tampons, douching, baths.       Medications:   - Motrin     Disposition: Home    Condition on discharge: Stable    Follow up: in 2 week(s)    Ronit Elam DO

## 2025-03-12 NOTE — PROGRESS NOTES
Discharge Phone Call    Patient Name: Alex Julio     OB Care Provider: Funmilayo Cruz DO Discharge Date: 3/12/2025    Disposition of baby:    Phone Number: [phone] (home)     Attempts to Contact:  Date:    Caller  Date:    Caller  Date:    Caller    Information for the patient's :  Sally Julio [[id number]]   Delivery Method: Vaginal, Spontaneous    1.  Now that you are at home is your pain being well controlled?   Y/N   If no, instruct to call       provider.      2. Are you breastfeeding?    Y/N    Do you need any extra support from our lactation staff?      Y/N    If yes, provide number for lactation.  3. Have you made or already had your first appointment with the baby's doctor? Y/N   If no, do      you know when to schedule it?  Y/N    4. Have you scheduled your follow-up appointment?  Y/N  If no, do you know when to schedule       it?    Y/N   If no, they can find it on printed discharge instructions.  5. Did staff discuss safe sleep during your stay? Y/N   6. Did we explain things in a way you could understand?  Y/N  7. Were we respectful of your preferences for labor and birth and include you in the plan of       care?  Y/N  If no, please explain _______________________________________________  8. Is there anyone in particular you would like to mention who provided care for you? _______      _________________________________________________________________________     9. Were you given a Post-Birth Warning Signs handout?  Y/N  Do you have it somewhere      easily accessible?  Y/N  If no, please send them a copy and ask them to put it somewhere      easily found.  10. Have you been crying excessively, having anger or mood swings that feel out of control, or       feel like you can't cope with caring for yourself or baby? Y/N   If yes, they may be showing       signs of postpartum depression and should call provider. There is also a        depression test on page C5

## 2025-03-28 ENCOUNTER — POSTPARTUM VISIT (OUTPATIENT)
Dept: OBGYN CLINIC | Age: 25
End: 2025-03-28

## 2025-03-28 VITALS
HEIGHT: 64 IN | HEART RATE: 65 BPM | SYSTOLIC BLOOD PRESSURE: 113 MMHG | WEIGHT: 161 LBS | OXYGEN SATURATION: 97 % | BODY MASS INDEX: 27.49 KG/M2 | DIASTOLIC BLOOD PRESSURE: 70 MMHG

## 2025-03-28 PROCEDURE — 0503F POSTPARTUM CARE VISIT: CPT | Performed by: OBSTETRICS & GYNECOLOGY

## 2025-04-05 NOTE — PROGRESS NOTES
Postpartum Visit    CC: No chief complaint on file.      25 y.o.  s/p vaginal delivery on 3/10/2025 here for her postpartum visit:    HPI:    Patient is doing well today.  Postpartum course has been uncomplicated.  Reports lochia is decreasing.   Pain is well controlled.  Ambulating without difficulty, denies dizziness with standing.  Denies concerns with bowel or bladder function.  Denies headaches, vision changes, RUQ pain, increased LE edema.  Denies chest pain, shortness of breath, fever, chills, nausea, vomiting.      Reports moods have been doing well.      Patient is breast feeding, which is going well.  Reports infant has been doing well without problems.      Patient is not sexually active.    Review of Systems - The following ROS was otherwise negative, except as noted in the HPI: constitutional, respiratory, cardiovascular, gastrointestinal, genitourinary    OB History  OB History    Para Term  AB Living   2 2 2   2   SAB IAB Ectopic Molar Multiple Live Births       0 2      # Outcome Date GA Lbr Gilbert/2nd Weight Sex Type Anes PTL Lv   2 Term 03/10/25 37w2d 01:00 / 00:08 2.846 kg (6 lb 4.4 oz) F Vag-Spont EPI N QUINTON   1 Term 23 38w1d / 02:46 2.855 kg (6 lb 4.7 oz) M Vag-Forceps EPI N QUINTON       Medications:  Prior to Admission medications    Medication Sig Start Date End Date Taking? Authorizing Provider   sertraline (ZOLOFT) 25 MG tablet Take 1 tablet by mouth daily 25  Yes Ronit Elam,    cetirizine (ZYRTEC ALLERGY) 10 MG tablet    Yes Provider, MD Jimena   Prenatal MV-Min-Fe Fum-FA-DHA (PRENATAL 1 PO) Take by mouth   Yes Provider, MD Jimena       Objective:  /70   Pulse 65   Ht 1.626 m (5' 4\")   Wt 73 kg (161 lb)   SpO2 97%   Breastfeeding Yes   BMI 27.64 kg/m²     Physical Exam  Vitals reviewed.   Constitutional:       General: She is not in acute distress.     Appearance: She is well-developed.   HENT:      Head: Normocephalic and

## 2025-04-25 ENCOUNTER — POSTPARTUM VISIT (OUTPATIENT)
Dept: OBGYN CLINIC | Age: 25
End: 2025-04-25

## 2025-04-25 VITALS
BODY MASS INDEX: 25.88 KG/M2 | TEMPERATURE: 97.6 F | HEIGHT: 64 IN | HEART RATE: 88 BPM | SYSTOLIC BLOOD PRESSURE: 118 MMHG | DIASTOLIC BLOOD PRESSURE: 74 MMHG | WEIGHT: 151.6 LBS

## 2025-04-25 PROCEDURE — 0503F POSTPARTUM CARE VISIT: CPT | Performed by: OBSTETRICS & GYNECOLOGY
